# Patient Record
Sex: MALE | Race: WHITE | NOT HISPANIC OR LATINO | Employment: FULL TIME | URBAN - METROPOLITAN AREA
[De-identification: names, ages, dates, MRNs, and addresses within clinical notes are randomized per-mention and may not be internally consistent; named-entity substitution may affect disease eponyms.]

---

## 2023-08-15 ENCOUNTER — HOSPITAL ENCOUNTER (OUTPATIENT)
Dept: DATA CONVERSION | Facility: HOSPITAL | Age: 63
Discharge: HOME | End: 2023-08-15

## 2023-08-15 DIAGNOSIS — R29.818 OTHER SYMPTOMS AND SIGNS INVOLVING THE NERVOUS SYSTEM: ICD-10-CM

## 2023-08-15 DIAGNOSIS — Z79.899 OTHER LONG TERM (CURRENT) DRUG THERAPY: ICD-10-CM

## 2023-08-15 DIAGNOSIS — Z87.891 PERSONAL HISTORY OF NICOTINE DEPENDENCE: ICD-10-CM

## 2023-08-15 DIAGNOSIS — Z79.82 LONG TERM (CURRENT) USE OF ASPIRIN: ICD-10-CM

## 2023-08-15 DIAGNOSIS — R51.9 HEADACHE, UNSPECIFIED: ICD-10-CM

## 2023-08-15 DIAGNOSIS — R56.9 UNSPECIFIED CONVULSIONS (MULTI): ICD-10-CM

## 2023-08-15 LAB
ANION GAP SERPL CALCULATED.3IONS-SCNC: 8 MMOL/L (ref 0–19)
BASOPHILS # BLD AUTO: 0.05 K/UL (ref 0–0.22)
BASOPHILS NFR BLD AUTO: 0.9 % (ref 0–1)
BUN SERPL-MCNC: 18 MG/DL (ref 8–25)
BUN/CREAT SERPL: 15 RATIO (ref 8–21)
CALCIUM SERPL-MCNC: 9.6 MG/DL (ref 8.5–10.4)
CHLORIDE SERPL-SCNC: 104 MMOL/L (ref 97–107)
CK SERPL-CCNC: 120 U/L (ref 24–195)
CO2 SERPL-SCNC: 26 MMOL/L (ref 24–31)
CREAT SERPL-MCNC: 1.2 MG/DL (ref 0.4–1.6)
DEPRECATED RDW RBC AUTO: 40.6 FL (ref 37–54)
DIFFERENTIAL METHOD BLD: ABNORMAL
EOSINOPHIL # BLD AUTO: 0.22 K/UL (ref 0–0.45)
EOSINOPHIL NFR BLD: 4 % (ref 0–3)
ERYTHROCYTE [DISTWIDTH] IN BLOOD BY AUTOMATED COUNT: 12.3 % (ref 11.7–15)
ETHANOL SERPL-MCNC: <0.01 GM/DL (ref 0–0.01)
GFR SERPL CREATININE-BSD FRML MDRD: 68 ML/MIN/1.73 M2
GLUCOSE BLD STRIP.AUTO-MCNC: 95 MG/DL (ref 65–99)
GLUCOSE SERPL-MCNC: 115 MG/DL (ref 65–99)
HCT VFR BLD AUTO: 43.7 % (ref 41–50)
HGB BLD-MCNC: 15.8 GM/DL (ref 13.5–16.5)
IMM GRANULOCYTES # BLD AUTO: 0.01 K/UL (ref 0–0.1)
LACTATE BLDV-SCNC: 1.2 MMOL/L (ref 0.4–2)
LYMPHOCYTES # BLD AUTO: 2.24 K/UL (ref 1.2–3.2)
LYMPHOCYTES NFR BLD MANUAL: 40.7 % (ref 20–40)
MAGNESIUM SERPL-MCNC: 1.9 MG/DL (ref 1.6–3.1)
MCH RBC QN AUTO: 32.8 PG (ref 26–34)
MCHC RBC AUTO-ENTMCNC: 36.2 % (ref 31–37)
MCV RBC AUTO: 90.9 FL (ref 80–100)
MONOCYTES # BLD AUTO: 0.41 K/UL (ref 0–0.8)
MONOCYTES NFR BLD MANUAL: 7.5 % (ref 0–8)
NEUTROPHILS # BLD AUTO: 2.57 K/UL
NEUTROPHILS # BLD AUTO: 2.57 K/UL (ref 1.8–7.7)
NEUTROPHILS.IMMATURE NFR BLD: 0.2 % (ref 0–1)
NEUTS SEG NFR BLD: 46.7 % (ref 50–70)
NRBC BLD-RTO: 0 /100 WBC
PLATELET # BLD AUTO: 179 K/UL (ref 150–450)
PMV BLD AUTO: 10.3 CU (ref 7–12.6)
POTASSIUM SERPL-SCNC: 4.1 MMOL/L (ref 3.4–5.1)
RBC # BLD AUTO: 4.81 M/UL (ref 4.5–5.5)
SODIUM SERPL-SCNC: 138 MMOL/L (ref 133–145)
WBC # BLD AUTO: 5.5 K/UL (ref 4.5–11)

## 2023-09-16 VITALS
OXYGEN SATURATION: 98 % | SYSTOLIC BLOOD PRESSURE: 154 MMHG | HEART RATE: 74 BPM | BODY MASS INDEX: 42.06 KG/M2 | HEIGHT: 67 IN | DIASTOLIC BLOOD PRESSURE: 94 MMHG | RESPIRATION RATE: 18 BRPM | WEIGHT: 268 LBS | TEMPERATURE: 97.3 F

## 2023-10-02 PROBLEM — R29.818 TRANSIENT NEUROLOGICAL SYMPTOMS: Status: ACTIVE | Noted: 2023-10-02

## 2023-10-02 PROBLEM — K21.9 GASTROESOPHAGEAL REFLUX DISEASE WITHOUT ESOPHAGITIS: Status: ACTIVE | Noted: 2023-10-02

## 2023-10-02 PROBLEM — F41.9 ANXIETY: Status: ACTIVE | Noted: 2023-10-02

## 2023-10-02 PROBLEM — I96: Status: ACTIVE | Noted: 2023-10-02

## 2023-10-02 PROBLEM — G45.9 TRANSIENT CEREBRAL ISCHEMIA: Status: ACTIVE | Noted: 2023-10-02

## 2023-10-02 PROBLEM — G47.19: Status: ACTIVE | Noted: 2023-10-02

## 2023-10-02 PROBLEM — L08.89 OTHER SPECIFIED LOCAL INFECTIONS OF THE SKIN AND SUBCUTANEOUS TISSUE: Status: ACTIVE | Noted: 2023-10-02

## 2023-10-02 PROBLEM — G47.33 OBSTRUCTIVE SLEEP APNEA SYNDROME: Status: ACTIVE | Noted: 2023-10-02

## 2023-10-02 PROBLEM — J45.909 ASTHMA (HHS-HCC): Status: ACTIVE | Noted: 2023-10-02

## 2023-10-02 PROBLEM — I63.9 CEREBRAL INFARCTION (MULTI): Status: ACTIVE | Noted: 2023-10-02

## 2023-10-02 PROBLEM — Z99.89 CPAP (CONTINUOUS POSITIVE AIRWAY PRESSURE) DEPENDENCE: Status: ACTIVE | Noted: 2023-10-02

## 2023-10-02 PROBLEM — F33.2 SEVERE RECURRENT MAJOR DEPRESSION WITHOUT PSYCHOTIC FEATURES (MULTI): Status: ACTIVE | Noted: 2023-10-02

## 2023-10-02 PROBLEM — E66.01 OBESITY, MORBID, BMI 40.0-49.9 (MULTI): Status: ACTIVE | Noted: 2023-10-02

## 2023-10-02 PROBLEM — G47.419 SLEEP ATTACK (HHS-HCC): Status: ACTIVE | Noted: 2023-10-02

## 2023-10-02 PROBLEM — I63.9 CEREBROVASCULAR ACCIDENT (CVA) (MULTI): Status: ACTIVE | Noted: 2023-10-02

## 2023-10-02 PROBLEM — E66.01 OBESITY, MORBID, BMI 40.0-49.9 (MULTI): Status: RESOLVED | Noted: 2023-10-02 | Resolved: 2023-10-02

## 2023-10-02 PROBLEM — R47.01 APHASIA: Status: ACTIVE | Noted: 2023-10-02

## 2023-10-02 PROBLEM — E83.119 HEMOCHROMATOSIS: Status: ACTIVE | Noted: 2023-10-02

## 2023-10-02 PROBLEM — F41.0 PANIC ATTACK: Status: ACTIVE | Noted: 2023-10-02

## 2023-10-02 PROBLEM — R25.1 TREMOR, PHYSIOLOGICAL: Status: ACTIVE | Noted: 2023-10-02

## 2023-10-02 PROBLEM — E78.5 HYPERLIPIDEMIA: Status: ACTIVE | Noted: 2023-10-02

## 2023-10-02 PROBLEM — E66.9 OBESITY (BMI 30-39.9): Status: ACTIVE | Noted: 2023-10-02

## 2023-10-02 PROBLEM — G47.52 REM SLEEP BEHAVIOR DISORDER: Status: ACTIVE | Noted: 2023-10-02

## 2023-10-02 PROBLEM — F44.5 PSYCHOGENIC NONEPILEPTIC SEIZURE: Status: ACTIVE | Noted: 2023-10-02

## 2023-10-02 PROBLEM — G43.901 STATUS MIGRAINOSUS: Status: ACTIVE | Noted: 2023-10-02

## 2023-10-02 PROBLEM — I10 HYPERTENSION: Status: ACTIVE | Noted: 2023-10-02

## 2023-10-02 PROBLEM — G47.10 HYPERSOMNIA: Status: ACTIVE | Noted: 2023-10-02

## 2023-10-02 PROBLEM — Z95.9 CARDIAC DEVICE IN SITU: Status: ACTIVE | Noted: 2023-10-02

## 2023-10-02 PROBLEM — M19.90 OSTEOARTHRITIS: Status: ACTIVE | Noted: 2023-10-02

## 2023-10-02 RX ORDER — MODAFINIL 100 MG/1
0.5 TABLET ORAL EVERY MORNING
COMMUNITY
End: 2023-11-02 | Stop reason: SDUPTHER

## 2023-10-02 RX ORDER — ASPIRIN 81 MG/1
81 TABLET ORAL DAILY
COMMUNITY
End: 2023-12-21 | Stop reason: ENTERED-IN-ERROR

## 2023-10-02 RX ORDER — CLOPIDOGREL BISULFATE 75 MG/1
1 TABLET ORAL DAILY
COMMUNITY
End: 2023-11-02 | Stop reason: WASHOUT

## 2023-10-02 RX ORDER — ATORVASTATIN CALCIUM 40 MG/1
40 TABLET, FILM COATED ORAL NIGHTLY
COMMUNITY
End: 2023-11-07

## 2023-10-02 RX ORDER — METOPROLOL SUCCINATE 25 MG/1
25 TABLET, EXTENDED RELEASE ORAL DAILY
COMMUNITY
End: 2023-11-07

## 2023-10-02 RX ORDER — SOLRIAMFETOL 150 MG/1
150 TABLET, FILM COATED ORAL DAILY
COMMUNITY
End: 2023-11-02 | Stop reason: SDUPTHER

## 2023-10-02 RX ORDER — GABAPENTIN 100 MG/1
CAPSULE ORAL
COMMUNITY
Start: 2023-08-15 | End: 2023-10-03

## 2023-10-02 RX ORDER — SERTRALINE HYDROCHLORIDE 100 MG/1
1 TABLET, FILM COATED ORAL DAILY
COMMUNITY
End: 2023-11-08 | Stop reason: ALTCHOICE

## 2023-10-02 RX ORDER — FLUTICASONE PROPIONATE 50 MCG
1 SPRAY, SUSPENSION (ML) NASAL DAILY
COMMUNITY
Start: 2022-07-28 | End: 2023-11-02 | Stop reason: WASHOUT

## 2023-10-02 RX ORDER — DULOXETIN HYDROCHLORIDE 60 MG/1
1 CAPSULE, DELAYED RELEASE ORAL DAILY
COMMUNITY
End: 2023-10-04 | Stop reason: SDUPTHER

## 2023-10-02 RX ORDER — ALBUTEROL SULFATE 90 UG/1
AEROSOL, METERED RESPIRATORY (INHALATION)
COMMUNITY

## 2023-10-02 RX ORDER — CHOLECALCIFEROL (VITAMIN D3) 25 MCG
1 TABLET ORAL DAILY
COMMUNITY
End: 2023-11-02 | Stop reason: WASHOUT

## 2023-10-02 RX ORDER — DULOXETIN HYDROCHLORIDE 30 MG/1
1 CAPSULE, DELAYED RELEASE ORAL DAILY
COMMUNITY
End: 2023-10-04 | Stop reason: SDUPTHER

## 2023-10-02 RX ORDER — ISOSORBIDE MONONITRATE 60 MG/1
60 TABLET, EXTENDED RELEASE ORAL DAILY
COMMUNITY
End: 2023-11-02 | Stop reason: WASHOUT

## 2023-10-02 RX ORDER — FENOFIBRATE 54 MG/1
54 TABLET ORAL DAILY
COMMUNITY
End: 2023-11-07

## 2023-10-03 DIAGNOSIS — R56.9 UNSPECIFIED CONVULSIONS (MULTI): ICD-10-CM

## 2023-10-03 RX ORDER — GABAPENTIN 100 MG/1
CAPSULE ORAL
Qty: 120 CAPSULE | Refills: 2 | Status: SHIPPED | OUTPATIENT
Start: 2023-10-03 | End: 2023-12-21 | Stop reason: ENTERED-IN-ERROR

## 2023-10-04 ENCOUNTER — TELEMEDICINE (OUTPATIENT)
Dept: BEHAVIORAL HEALTH | Facility: CLINIC | Age: 63
End: 2023-10-04
Payer: COMMERCIAL

## 2023-10-04 DIAGNOSIS — F33.1 MODERATE EPISODE OF RECURRENT MAJOR DEPRESSIVE DISORDER (MULTI): ICD-10-CM

## 2023-10-04 DIAGNOSIS — F41.9 ANXIETY: ICD-10-CM

## 2023-10-04 PROCEDURE — 99213 OFFICE O/P EST LOW 20 MIN: CPT | Performed by: PSYCHIATRY & NEUROLOGY

## 2023-10-04 RX ORDER — DULOXETIN HYDROCHLORIDE 60 MG/1
60 CAPSULE, DELAYED RELEASE ORAL DAILY
Qty: 90 CAPSULE | Refills: 0 | Status: SHIPPED | OUTPATIENT
Start: 2023-10-04 | End: 2023-11-08 | Stop reason: SDUPTHER

## 2023-10-04 RX ORDER — DULOXETIN HYDROCHLORIDE 30 MG/1
30 CAPSULE, DELAYED RELEASE ORAL DAILY
Qty: 90 CAPSULE | Refills: 0 | Status: SHIPPED | OUTPATIENT
Start: 2023-10-04 | End: 2023-11-08 | Stop reason: SDUPTHER

## 2023-10-04 ASSESSMENT — ENCOUNTER SYMPTOMS
AGITATION: 0
DIARRHEA: 1
HEADACHES: 1
NAUSEA: 1

## 2023-10-04 NOTE — PROGRESS NOTES
Subjective   Patient ID: Jace Villa is a 62 y.o. male follow up for anxiety     Presents virtual visit for follow up.   We increased Cymbalta last preston, feels improved, not as anxious or tense.   Still have non epileptic seizure, neurology.   Has not seen a therapist yet.   Wearing his Cpap           Review of Systems   Gastrointestinal:  Positive for diarrhea and nausea.   Neurological:  Positive for headaches.        Non epileptic seizure ???   Psychiatric/Behavioral:  Negative for agitation.        Objective   Physical Exam  Psychiatric:         Attention and Perception: He is attentive. He does not perceive auditory or visual hallucinations.         Mood and Affect: Mood is anxious. Mood is not depressed or elated. Affect is not labile, blunt, flat, angry, tearful or inappropriate.         Speech: He is communicative. Speech is not rapid and pressured, delayed, slurred or tangential.         Behavior: Behavior normal. Behavior is cooperative.         Thought Content: Thought content normal.         Cognition and Memory: Cognition and memory normal.         Judgment: Judgment normal.         Assessment/Plan   Diagnoses and all orders for this visit:  Moderate episode of recurrent major depressive disorder (CMS/HCC)  Anxiety      Will renew Cymbalta   Therapy referral   Follow up 3 months or sooner if needed

## 2023-11-02 ENCOUNTER — OFFICE VISIT (OUTPATIENT)
Dept: SLEEP MEDICINE | Facility: CLINIC | Age: 63
End: 2023-11-02
Payer: COMMERCIAL

## 2023-11-02 VITALS
WEIGHT: 258 LBS | BODY MASS INDEX: 38.21 KG/M2 | OXYGEN SATURATION: 93 % | HEART RATE: 73 BPM | DIASTOLIC BLOOD PRESSURE: 76 MMHG | SYSTOLIC BLOOD PRESSURE: 144 MMHG | HEIGHT: 69 IN

## 2023-11-02 DIAGNOSIS — G47.33 OBSTRUCTIVE SLEEP APNEA SYNDROME: Primary | ICD-10-CM

## 2023-11-02 PROCEDURE — 1036F TOBACCO NON-USER: CPT | Performed by: INTERNAL MEDICINE

## 2023-11-02 PROCEDURE — 3077F SYST BP >= 140 MM HG: CPT | Performed by: INTERNAL MEDICINE

## 2023-11-02 PROCEDURE — 99214 OFFICE O/P EST MOD 30 MIN: CPT | Performed by: INTERNAL MEDICINE

## 2023-11-02 PROCEDURE — 3078F DIAST BP <80 MM HG: CPT | Performed by: INTERNAL MEDICINE

## 2023-11-02 RX ORDER — SOLRIAMFETOL 150 MG/1
150 TABLET, FILM COATED ORAL DAILY
Qty: 90 TABLET | Refills: 0 | Status: SHIPPED | OUTPATIENT
Start: 2023-11-02 | End: 2023-12-19

## 2023-11-02 RX ORDER — MODAFINIL 100 MG/1
50 TABLET ORAL EVERY MORNING
Qty: 45 TABLET | Refills: 0 | Status: SHIPPED | OUTPATIENT
Start: 2023-11-02 | End: 2024-01-31

## 2023-11-02 ASSESSMENT — PAIN SCALES - GENERAL: PAINLEVEL: 0-NO PAIN

## 2023-11-02 ASSESSMENT — SLEEP AND FATIGUE QUESTIONNAIRES
HOW LIKELY ARE YOU TO NOD OFF OR FALL ASLEEP WHILE SITTING QUIETLY AFTER LUNCH WITHOUT ALCOHOL: SLIGHT CHANCE OF DOZING
HOW LIKELY ARE YOU TO NOD OFF OR FALL ASLEEP WHILE LYING DOWN TO REST IN THE AFTERNOON WHEN CIRCUMSTANCES PERMIT: HIGH CHANCE OF DOZING
HOW LIKELY ARE YOU TO NOD OFF OR FALL ASLEEP WHEN YOU ARE A PASSENGER IN A CAR FOR AN HOUR WITHOUT A BREAK: SLIGHT CHANCE OF DOZING
HOW LIKELY ARE YOU TO NOD OFF OR FALL ASLEEP WHILE WATCHING TV: MODERATE CHANCE OF DOZING
HOW LIKELY ARE YOU TO NOD OFF OR FALL ASLEEP WHILE SITTING AND READING: SLIGHT CHANCE OF DOZING
HOW LIKELY ARE YOU TO NOD OFF OR FALL ASLEEP IN A CAR, WHILE STOPPED FOR A FEW MINUTES IN TRAFFIC: WOULD NEVER DOZE
HOW LIKELY ARE YOU TO NOD OFF OR FALL ASLEEP WHILE SITTING AND TALKING TO SOMEONE: WOULD NEVER DOZE
ESS-CHAD TOTAL SCORE: 10
SITING INACTIVE IN A PUBLIC PLACE LIKE A CLASS ROOM OR A MOVIE THEATER: MODERATE CHANCE OF DOZING

## 2023-11-02 NOTE — ASSESSMENT & PLAN NOTE
Good control with CPAP and the combination of sunosi 150 mg and modafinil 50 mg. Did not tolerate higher doses of modafinil alone and was still symptomatic with sunosi 150 mg alone. This combination is effective and allows him to function. He is following with a headache specialist to determine the cause of his headaches

## 2023-11-02 NOTE — PROGRESS NOTES
"    Subjective   Patient ID: Jace Villa is a 63 y.o. male who presents for Pap Adherence Followup and Sleep Apnea.  HPI    Prior Sleep History:      Current Sleep History:  Here to follow-up on management of his obstructive sleep apnea with hypersomnolence    A downloaded compliance report was reviewed and was interpreted by myself as follows:  > 4 hour compliance was 93% with an average use of 6 hours and 16 minutes, with a residual AHI 2.7 CPAP 12 cm H2O.   Currently managed with Sunosi 150 mg daily and modafinil 50 mg daily with good response.  He continues to follow-up with neurology and psychiatry for his last seizure.  He is scheduled for video EEG through neurology he is advised to not drive. He is going to [facility] for neurology because he reports that he has had difficulty getting in to see a neurologist at . He is looking to go on disability is concerned about temporary disability running out.    Jace Villa reports good benefit from his device.    ESS: 10     Review of Systems  Review of systems negative except as per HPI  Objective   /76   Pulse 73   Ht 1.753 m (5' 9\")   Wt 117 kg (258 lb)   SpO2 93%   BMI 38.10 kg/m²    Physical Exam  PHYSICAL EXAM: GENERAL: alert pleasant and cooperative no acute distress  PSYCH EXAM: alert,oriented, in NAD with a full range of affect, normal behavior and no psychotic features    Lab Results   Component Value Date    IRON 270 (H) 07/26/2023    TIBC 347 07/26/2023    FERRITIN 287 07/26/2023        Assessment/Plan   Problem List Items Addressed This Visit             ICD-10-CM    Obstructive sleep apnea syndrome - Primary G47.33     Good control with CPAP and the combination of sunosi 150 mg and modafinil 50 mg. Did not tolerate higher doses of modafinil alone and was still symptomatic with sunosi 150 mg alone. This combination is effective and allows him to function         Relevant Medications    modafinil (Provigil) 100 mg tablet    solriamfetoL " (Sunosi) 150 mg tablet

## 2023-11-03 RX ORDER — BUTALBITAL, ACETAMINOPHEN AND CAFFEINE 50; 325; 40 MG/1; MG/1; MG/1
TABLET ORAL
COMMUNITY
Start: 2023-07-26 | End: 2023-12-21 | Stop reason: ALTCHOICE

## 2023-11-03 RX ORDER — IBUPROFEN 100 MG/5ML
1000 SUSPENSION, ORAL (FINAL DOSE FORM) ORAL
COMMUNITY
End: 2023-12-21 | Stop reason: ENTERED-IN-ERROR

## 2023-11-03 RX ORDER — DOXYCYCLINE HYCLATE 100 MG
TABLET ORAL
COMMUNITY
Start: 2023-01-28 | End: 2023-11-08 | Stop reason: ALTCHOICE

## 2023-11-03 RX ORDER — COVID-19 ANTIGEN TEST
KIT MISCELLANEOUS
COMMUNITY
Start: 2023-04-14 | End: 2023-12-21 | Stop reason: ENTERED-IN-ERROR

## 2023-11-03 RX ORDER — NABUMETONE 500 MG/1
TABLET, FILM COATED ORAL
COMMUNITY
Start: 2015-08-01 | End: 2023-12-21 | Stop reason: ENTERED-IN-ERROR

## 2023-11-03 RX ORDER — SIMVASTATIN 40 MG/1
40 TABLET, FILM COATED ORAL
COMMUNITY
End: 2023-12-21 | Stop reason: ENTERED-IN-ERROR

## 2023-11-03 RX ORDER — AZITHROMYCIN 250 MG/1
TABLET, FILM COATED ORAL
COMMUNITY
Start: 2023-01-30 | End: 2023-11-08 | Stop reason: ALTCHOICE

## 2023-11-03 RX ORDER — PREDNISONE 20 MG/1
TABLET ORAL
COMMUNITY
Start: 2023-03-29 | End: 2023-11-08 | Stop reason: ALTCHOICE

## 2023-11-03 RX ORDER — BENZONATATE 200 MG/1
200 CAPSULE ORAL 3 TIMES DAILY
COMMUNITY
Start: 2023-03-29 | End: 2023-04-08

## 2023-11-03 RX ORDER — DOXYCYCLINE 100 MG/1
100 CAPSULE ORAL 2 TIMES DAILY
COMMUNITY
Start: 2023-03-29 | End: 2023-04-08

## 2023-11-03 RX ORDER — METOPROLOL SUCCINATE 50 MG/1
50 TABLET, EXTENDED RELEASE ORAL DAILY
COMMUNITY
Start: 2023-05-05 | End: 2023-08-03 | Stop reason: WASHOUT

## 2023-11-03 RX ORDER — AMOXICILLIN AND CLAVULANATE POTASSIUM 875; 125 MG/1; MG/1
1 TABLET, FILM COATED ORAL EVERY 12 HOURS
COMMUNITY
Start: 2022-12-03 | End: 2022-12-13

## 2023-11-05 DIAGNOSIS — E78.49 OTHER HYPERLIPIDEMIA: ICD-10-CM

## 2023-11-07 RX ORDER — METOPROLOL SUCCINATE 25 MG/1
25 TABLET, EXTENDED RELEASE ORAL DAILY
Qty: 90 TABLET | Refills: 3 | Status: SHIPPED | OUTPATIENT
Start: 2023-11-07 | End: 2024-11-01

## 2023-11-07 RX ORDER — ATORVASTATIN CALCIUM 40 MG/1
40 TABLET, FILM COATED ORAL DAILY
Qty: 90 TABLET | Refills: 3 | Status: SHIPPED | OUTPATIENT
Start: 2023-11-07 | End: 2024-11-01

## 2023-11-07 RX ORDER — FENOFIBRATE 54 MG/1
TABLET ORAL
Qty: 90 TABLET | Refills: 3 | Status: SHIPPED | OUTPATIENT
Start: 2023-11-07

## 2023-11-08 ENCOUNTER — OFFICE VISIT (OUTPATIENT)
Dept: BEHAVIORAL HEALTH | Facility: CLINIC | Age: 63
End: 2023-11-08
Payer: COMMERCIAL

## 2023-11-08 VITALS
HEART RATE: 89 BPM | TEMPERATURE: 97.8 F | HEIGHT: 69 IN | BODY MASS INDEX: 39.4 KG/M2 | SYSTOLIC BLOOD PRESSURE: 135 MMHG | DIASTOLIC BLOOD PRESSURE: 85 MMHG | WEIGHT: 266 LBS

## 2023-11-08 DIAGNOSIS — F33.1 MODERATE EPISODE OF RECURRENT MAJOR DEPRESSIVE DISORDER (MULTI): ICD-10-CM

## 2023-11-08 DIAGNOSIS — F41.9 ANXIETY: ICD-10-CM

## 2023-11-08 PROCEDURE — 1036F TOBACCO NON-USER: CPT | Performed by: PSYCHIATRY & NEUROLOGY

## 2023-11-08 PROCEDURE — 3079F DIAST BP 80-89 MM HG: CPT | Performed by: PSYCHIATRY & NEUROLOGY

## 2023-11-08 PROCEDURE — 3075F SYST BP GE 130 - 139MM HG: CPT | Performed by: PSYCHIATRY & NEUROLOGY

## 2023-11-08 PROCEDURE — 99214 OFFICE O/P EST MOD 30 MIN: CPT | Performed by: PSYCHIATRY & NEUROLOGY

## 2023-11-08 RX ORDER — DULOXETIN HYDROCHLORIDE 30 MG/1
30 CAPSULE, DELAYED RELEASE ORAL DAILY
Qty: 90 CAPSULE | Refills: 0 | Status: SHIPPED | OUTPATIENT
Start: 2023-11-08 | End: 2024-01-17 | Stop reason: DRUGHIGH

## 2023-11-08 RX ORDER — DULOXETIN HYDROCHLORIDE 60 MG/1
60 CAPSULE, DELAYED RELEASE ORAL DAILY
Qty: 90 CAPSULE | Refills: 0 | Status: SHIPPED | OUTPATIENT
Start: 2023-11-08 | End: 2024-01-17 | Stop reason: SDUPTHER

## 2023-11-08 ASSESSMENT — ENCOUNTER SYMPTOMS
DECREASED CONCENTRATION: 0
HALLUCINATIONS: 0
NUMBNESS: 1
LIGHT-HEADEDNESS: 1
CONSTIPATION: 0
CONFUSION: 0
FACIAL ASYMMETRY: 0
HYPERACTIVE: 0
TREMORS: 1
HEADACHES: 1
SEIZURES: 1
AGITATION: 0
DYSPHORIC MOOD: 0
NAUSEA: 0
NERVOUS/ANXIOUS: 0
DIARRHEA: 0
VOMITING: 0
SLEEP DISTURBANCE: 0
PALPITATIONS: 1

## 2023-11-08 NOTE — PROGRESS NOTES
Subjective   Patient ID: Jace Villa is a 63 y.o. male who presents for follow up  Doing projects around the house, enjoys it. Keeps him busy. Sleep is alright overall, follows up with sleep medicine, on C pap and Provigil   Follows up with neurology, scheduled for video EEG this weekend   Still has non epileptic episodes   Has not been in therapy yet. Talked about his relationship with his  father and explored its relationship with his non epileptic seizure ( time-line )        Review of Systems   Cardiovascular:  Positive for chest pain and palpitations.   Gastrointestinal:  Negative for constipation, diarrhea, nausea and vomiting.   Neurological:  Positive for tremors, seizures, light-headedness, numbness and headaches. Negative for facial asymmetry.        Non epileptic seizure   Psychiatric/Behavioral:  Negative for agitation, behavioral problems, confusion, decreased concentration, dysphoric mood, hallucinations, self-injury, sleep disturbance and suicidal ideas. The patient is not nervous/anxious and is not hyperactive.        Objective   Physical Exam  Psychiatric:         Attention and Perception: Attention and perception normal.         Mood and Affect: Mood is anxious.         Speech: Speech normal.         Behavior: Behavior normal. Behavior is cooperative.         Thought Content: Thought content normal.         Cognition and Memory: Cognition normal.         Judgment: Judgment normal.       There were no vitals filed for this visit.   Legacy Encounter on 2023   Component Date Value    Calcium 2023 9.9     AST 2023 37     Alkaline Phosphatase 2023 69     Total Bilirubin 2023 0.7     Total Protein 2023 6.9     Albumin 2023 4.4     Globulin, Total 2023 2.5     A/G Ratio 2023 1.8     Sodium 2023 139     Potassium 2023 4.5     Chloride 2023 102     Bicarbonate 2023 25     Anion Gap 2023 12     Urea Nitrogen  07/26/2023 17     Creatinine 07/26/2023 1.2     Urea Nitrogen/Creatinine* 07/26/2023 14.2     Glucose 07/26/2023 116 (H)     ALT (SGPT) 07/26/2023 47 (H)     ESTIMATED GFR 07/26/2023 68     Differential Type 07/26/2023 AUTO DIFF     Immature Granulocyte %, * 07/26/2023 0.20     Neutrophil 07/26/2023 52.00     Lymphocytes % 07/26/2023 36.10     Monocytes % 07/26/2023 7.70     Eosinophil 07/26/2023 3.30 (H)     Basophils % 07/26/2023 0.70     Immature Granulocytes Ab* 07/26/2023 0.01     Neutrophils Absolute 07/26/2023 3.03     Lymphocytes Absolute 07/26/2023 2.10     Monocytes Absolute 07/26/2023 0.45     Eosinophils Absolute 07/26/2023 0.19     Basophils Absolute 07/26/2023 0.04     WBC 07/26/2023 5.8     RBC 07/26/2023 5.16     Hemoglobin 07/26/2023 16.5     Hematocrit 07/26/2023 47.2     MCV 07/26/2023 91.5     MCH 07/26/2023 32.0     MCHC 07/26/2023 35.0     RDW-SD 07/26/2023 42.1     RDW-CV 07/26/2023 12.6     Platelets 07/26/2023 209     MPV 07/26/2023 10.9     nRBC 07/26/2023 0     ABSOLUTE NEUTROPHIL CALC* 07/26/2023 3.03     Ferritin 07/26/2023 287     Iron 07/26/2023 270 (H)     TIBC 07/26/2023 347     Iron Saturation 07/26/2023 77.8 (H)     SERUM COLOR 07/26/2023 YELLOW     SERUM CLARITY 07/26/2023 CLEAR     Is the patient fasting? 07/26/2023 FASTING     Cholesterol 07/26/2023 143     Triglycerides 07/26/2023 169 (H)     HDL 07/26/2023 42     LDL Calculated 07/26/2023 67     Cholesterol/HDL Ratio 07/26/2023 3.4     PSA 07/26/2023 2.3     Thyroid Stimulating Horm* 07/26/2023 1.70      Lab Results   Component Value Date     08/15/2023     07/26/2023     08/01/2022    K 4.1 08/15/2023    K 4.5 07/26/2023    K 4.3 08/01/2022    CO2 26 08/15/2023    CO2 25 07/26/2023    CO2 32 (H) 08/01/2022    BUN 18 08/15/2023    BUN 17 07/26/2023    BUN 19 08/01/2022    CREATININE 1.2 08/15/2023    CREATININE 1.2 07/26/2023    CREATININE 1.1 08/01/2022    GLUCOSE 115 (H) 08/15/2023    GLUCOSE 116 (H)  07/26/2023    GLUCOSE 114 (H) 08/01/2022    CALCIUM 9.6 08/15/2023    CALCIUM 9.9 07/26/2023    CALCIUM 10.1 08/01/2022     Lab Results   Component Value Date    WBC 5.5 08/15/2023    HGB 15.8 08/15/2023    HCT 43.7 08/15/2023    MCV 90.9 08/15/2023     08/15/2023     Lab Results   Component Value Date    CHOL 143 07/26/2023    TRIG 169 (H) 07/26/2023    HDL 42 07/26/2023     Assessment/Plan   Problem List Items Addressed This Visit             ICD-10-CM    Anxiety F41.9    Moderate episode of recurrent major depressive disorder (CMS/HCC) F33.1     Continue Cymbalta as prescribed   Explored triggers, worked insight   Follow up with neurology recommendations   A list of therapists was given to him, encouraged to schedule for therapy for more in depth stress management   Follow up in 3 months or sooner if needed

## 2023-12-13 ENCOUNTER — HOSPITAL ENCOUNTER (OUTPATIENT)
Dept: CARDIOLOGY | Facility: HOSPITAL | Age: 63
Discharge: HOME | End: 2023-12-13
Payer: COMMERCIAL

## 2023-12-13 DIAGNOSIS — I25.10 CORONARY ARTERY DISEASE, UNSPECIFIED VESSEL OR LESION TYPE, UNSPECIFIED WHETHER ANGINA PRESENT, UNSPECIFIED WHETHER NATIVE OR TRANSPLANTED HEART: ICD-10-CM

## 2023-12-13 LAB
AORTIC VALVE PEAK VELOCITY: 0.96
AV PEAK GRADIENT: 3.7
EJECTION FRACTION APICAL 4 CHAMBER: 54.8
EJECTION FRACTION: 48
LEFT ATRIUM VOLUME AREA LENGTH INDEX BSA: 24.6
LEFT VENTRICLE INTERNAL DIMENSION DIASTOLE: 5.01 (ref 3.5–6)
MITRAL VALVE E/A RATIO: 1.07
MITRAL VALVE E/E' RATIO: 11.6
RIGHT VENTRICLE FREE WALL PEAK S': 9.79

## 2023-12-13 PROCEDURE — 93306 TTE W/DOPPLER COMPLETE: CPT

## 2023-12-15 ENCOUNTER — LAB (OUTPATIENT)
Dept: LAB | Facility: LAB | Age: 63
End: 2023-12-15
Payer: COMMERCIAL

## 2023-12-15 DIAGNOSIS — I25.10 ATHEROSCLEROTIC HEART DISEASE OF NATIVE CORONARY ARTERY WITHOUT ANGINA PECTORIS: Primary | ICD-10-CM

## 2023-12-15 PROCEDURE — 83880 ASSAY OF NATRIURETIC PEPTIDE: CPT

## 2023-12-15 PROCEDURE — 36415 COLL VENOUS BLD VENIPUNCTURE: CPT

## 2023-12-16 LAB — BNP SERPL-MCNC: 12 PG/ML (ref 0–99)

## 2023-12-19 DIAGNOSIS — G47.33 OBSTRUCTIVE SLEEP APNEA SYNDROME: ICD-10-CM

## 2023-12-19 RX ORDER — SOLRIAMFETOL 150 MG/1
TABLET, FILM COATED ORAL
Qty: 30 TABLET | Refills: 2 | Status: SHIPPED | OUTPATIENT
Start: 2023-12-19

## 2023-12-21 ENCOUNTER — APPOINTMENT (OUTPATIENT)
Dept: RADIOLOGY | Facility: HOSPITAL | Age: 63
End: 2023-12-21
Payer: COMMERCIAL

## 2023-12-21 ENCOUNTER — APPOINTMENT (OUTPATIENT)
Dept: CARDIOLOGY | Facility: HOSPITAL | Age: 63
End: 2023-12-21
Payer: COMMERCIAL

## 2023-12-21 ENCOUNTER — HOSPITAL ENCOUNTER (OUTPATIENT)
Facility: HOSPITAL | Age: 63
Setting detail: OBSERVATION
Discharge: HOME | End: 2023-12-22
Attending: STUDENT IN AN ORGANIZED HEALTH CARE EDUCATION/TRAINING PROGRAM | Admitting: INTERNAL MEDICINE
Payer: COMMERCIAL

## 2023-12-21 DIAGNOSIS — R07.9 CHEST PAIN, UNSPECIFIED TYPE: Primary | ICD-10-CM

## 2023-12-21 DIAGNOSIS — N17.9 ACUTE RENAL INJURY (CMS-HCC): ICD-10-CM

## 2023-12-21 LAB
ALBUMIN SERPL-MCNC: 3.9 G/DL (ref 3.5–5)
ALP BLD-CCNC: 58 U/L (ref 35–125)
ALT SERPL-CCNC: 35 U/L (ref 5–40)
ANION GAP SERPL CALC-SCNC: 9 MMOL/L
AST SERPL-CCNC: 24 U/L (ref 5–40)
BASOPHILS # BLD AUTO: 0.05 X10*3/UL (ref 0–0.1)
BASOPHILS NFR BLD AUTO: 0.6 %
BILIRUB SERPL-MCNC: 0.4 MG/DL (ref 0.1–1.2)
BUN SERPL-MCNC: 19 MG/DL (ref 8–25)
CALCIUM SERPL-MCNC: 9.4 MG/DL (ref 8.5–10.4)
CHLORIDE SERPL-SCNC: 105 MMOL/L (ref 97–107)
CO2 SERPL-SCNC: 24 MMOL/L (ref 24–31)
CREAT SERPL-MCNC: 2 MG/DL (ref 0.4–1.6)
EOSINOPHIL # BLD AUTO: 0.15 X10*3/UL (ref 0–0.7)
EOSINOPHIL NFR BLD AUTO: 1.9 %
ERYTHROCYTE [DISTWIDTH] IN BLOOD BY AUTOMATED COUNT: 12.7 % (ref 11.5–14.5)
GFR SERPL CREATININE-BSD FRML MDRD: 37 ML/MIN/1.73M*2
GLUCOSE SERPL-MCNC: 128 MG/DL (ref 65–99)
HCT VFR BLD AUTO: 46.3 % (ref 41–52)
HGB BLD-MCNC: 16.5 G/DL (ref 13.5–17.5)
IMM GRANULOCYTES # BLD AUTO: 0.03 X10*3/UL (ref 0–0.7)
IMM GRANULOCYTES NFR BLD AUTO: 0.4 % (ref 0–0.9)
LIPASE SERPL-CCNC: 25 U/L (ref 16–63)
LYMPHOCYTES # BLD AUTO: 2.28 X10*3/UL (ref 1.2–4.8)
LYMPHOCYTES NFR BLD AUTO: 28.8 %
MCH RBC QN AUTO: 32.5 PG (ref 26–34)
MCHC RBC AUTO-ENTMCNC: 35.6 G/DL (ref 32–36)
MCV RBC AUTO: 91 FL (ref 80–100)
MONOCYTES # BLD AUTO: 0.64 X10*3/UL (ref 0.1–1)
MONOCYTES NFR BLD AUTO: 8.1 %
NEUTROPHILS # BLD AUTO: 4.76 X10*3/UL (ref 1.2–7.7)
NEUTROPHILS NFR BLD AUTO: 60.2 %
NRBC BLD-RTO: 0 /100 WBCS (ref 0–0)
PLATELET # BLD AUTO: 168 X10*3/UL (ref 150–450)
POTASSIUM SERPL-SCNC: 4.8 MMOL/L (ref 3.4–5.1)
PROT SERPL-MCNC: 6.7 G/DL (ref 5.9–7.9)
RBC # BLD AUTO: 5.07 X10*6/UL (ref 4.5–5.9)
SODIUM SERPL-SCNC: 138 MMOL/L (ref 133–145)
TROPONIN T SERPL-MCNC: 21 NG/L
TROPONIN T SERPL-MCNC: 23 NG/L
WBC # BLD AUTO: 7.9 X10*3/UL (ref 4.4–11.3)

## 2023-12-21 PROCEDURE — 36415 COLL VENOUS BLD VENIPUNCTURE: CPT | Performed by: PHYSICIAN ASSISTANT

## 2023-12-21 PROCEDURE — 85025 COMPLETE CBC W/AUTO DIFF WBC: CPT | Performed by: PHYSICIAN ASSISTANT

## 2023-12-21 PROCEDURE — 83690 ASSAY OF LIPASE: CPT | Performed by: PHYSICIAN ASSISTANT

## 2023-12-21 PROCEDURE — G0378 HOSPITAL OBSERVATION PER HR: HCPCS

## 2023-12-21 PROCEDURE — 71046 X-RAY EXAM CHEST 2 VIEWS: CPT

## 2023-12-21 PROCEDURE — 84484 ASSAY OF TROPONIN QUANT: CPT | Performed by: PHYSICIAN ASSISTANT

## 2023-12-21 PROCEDURE — 93005 ELECTROCARDIOGRAM TRACING: CPT

## 2023-12-21 PROCEDURE — 80053 COMPREHEN METABOLIC PANEL: CPT | Performed by: PHYSICIAN ASSISTANT

## 2023-12-21 PROCEDURE — 93010 ELECTROCARDIOGRAM REPORT: CPT | Performed by: INTERNAL MEDICINE

## 2023-12-21 PROCEDURE — 96372 THER/PROPH/DIAG INJ SC/IM: CPT

## 2023-12-21 PROCEDURE — 74176 CT ABD & PELVIS W/O CONTRAST: CPT

## 2023-12-21 PROCEDURE — 99285 EMERGENCY DEPT VISIT HI MDM: CPT | Mod: 25 | Performed by: STUDENT IN AN ORGANIZED HEALTH CARE EDUCATION/TRAINING PROGRAM

## 2023-12-21 RX ORDER — ONDANSETRON HYDROCHLORIDE 2 MG/ML
4 INJECTION, SOLUTION INTRAVENOUS EVERY 8 HOURS PRN
Status: DISCONTINUED | OUTPATIENT
Start: 2023-12-21 | End: 2023-12-22 | Stop reason: HOSPADM

## 2023-12-21 RX ORDER — ENOXAPARIN SODIUM 100 MG/ML
40 INJECTION SUBCUTANEOUS EVERY 24 HOURS
Status: DISCONTINUED | OUTPATIENT
Start: 2023-12-21 | End: 2023-12-22 | Stop reason: HOSPADM

## 2023-12-21 RX ORDER — DULOXETIN HYDROCHLORIDE 30 MG/1
30 CAPSULE, DELAYED RELEASE ORAL
Status: DISCONTINUED | OUTPATIENT
Start: 2023-12-22 | End: 2023-12-22 | Stop reason: HOSPADM

## 2023-12-21 RX ORDER — ACETAMINOPHEN 325 MG/1
650 TABLET ORAL EVERY 4 HOURS PRN
Status: DISCONTINUED | OUTPATIENT
Start: 2023-12-21 | End: 2023-12-22 | Stop reason: HOSPADM

## 2023-12-21 RX ORDER — RANOLAZINE 500 MG/1
500 TABLET, EXTENDED RELEASE ORAL 2 TIMES DAILY
Status: DISCONTINUED | OUTPATIENT
Start: 2023-12-21 | End: 2023-12-22 | Stop reason: HOSPADM

## 2023-12-21 RX ORDER — ISOSORBIDE MONONITRATE 60 MG/1
60 TABLET, EXTENDED RELEASE ORAL DAILY
COMMUNITY

## 2023-12-21 RX ORDER — FENOFIBRATE 54 MG/1
54 TABLET ORAL DAILY
Status: DISCONTINUED | OUTPATIENT
Start: 2023-12-22 | End: 2023-12-22 | Stop reason: HOSPADM

## 2023-12-21 RX ORDER — ISOSORBIDE MONONITRATE 60 MG/1
60 TABLET, EXTENDED RELEASE ORAL DAILY
Status: DISCONTINUED | OUTPATIENT
Start: 2023-12-22 | End: 2023-12-22 | Stop reason: HOSPADM

## 2023-12-21 RX ORDER — RANOLAZINE 500 MG/1
500 TABLET, EXTENDED RELEASE ORAL 2 TIMES DAILY
COMMUNITY

## 2023-12-21 RX ORDER — NAPROXEN SODIUM 220 MG/1
81 TABLET, FILM COATED ORAL ONCE
Status: COMPLETED | OUTPATIENT
Start: 2023-12-21 | End: 2023-12-22

## 2023-12-21 RX ORDER — MODAFINIL 100 MG/1
50 TABLET ORAL EVERY MORNING
Status: DISCONTINUED | OUTPATIENT
Start: 2023-12-22 | End: 2023-12-22 | Stop reason: HOSPADM

## 2023-12-21 RX ORDER — ONDANSETRON 4 MG/1
4 TABLET, ORALLY DISINTEGRATING ORAL EVERY 8 HOURS PRN
Status: DISCONTINUED | OUTPATIENT
Start: 2023-12-21 | End: 2023-12-22 | Stop reason: HOSPADM

## 2023-12-21 RX ORDER — DULOXETIN HYDROCHLORIDE 60 MG/1
60 CAPSULE, DELAYED RELEASE ORAL DAILY
Status: DISCONTINUED | OUTPATIENT
Start: 2023-12-22 | End: 2023-12-22 | Stop reason: HOSPADM

## 2023-12-21 RX ORDER — ATORVASTATIN CALCIUM 40 MG/1
40 TABLET, FILM COATED ORAL DAILY
Status: DISCONTINUED | OUTPATIENT
Start: 2023-12-22 | End: 2023-12-22 | Stop reason: HOSPADM

## 2023-12-21 RX ORDER — METOPROLOL SUCCINATE 25 MG/1
25 TABLET, EXTENDED RELEASE ORAL DAILY
Status: DISCONTINUED | OUTPATIENT
Start: 2023-12-22 | End: 2023-12-22 | Stop reason: HOSPADM

## 2023-12-21 SDOH — SOCIAL STABILITY: SOCIAL INSECURITY: HAVE YOU HAD THOUGHTS OF HARMING ANYONE ELSE?: NO

## 2023-12-21 SDOH — SOCIAL STABILITY: SOCIAL INSECURITY: ABUSE: ADULT

## 2023-12-21 ASSESSMENT — ENCOUNTER SYMPTOMS
SHORTNESS OF BREATH: 1
CONSTITUTIONAL NEGATIVE: 1
EYES NEGATIVE: 1
PSYCHIATRIC NEGATIVE: 1
ENDOCRINE NEGATIVE: 1
GASTROINTESTINAL NEGATIVE: 1
NEUROLOGICAL NEGATIVE: 1
MUSCULOSKELETAL NEGATIVE: 1

## 2023-12-21 ASSESSMENT — COLUMBIA-SUICIDE SEVERITY RATING SCALE - C-SSRS
6. HAVE YOU EVER DONE ANYTHING, STARTED TO DO ANYTHING, OR PREPARED TO DO ANYTHING TO END YOUR LIFE?: NO
2. HAVE YOU ACTUALLY HAD ANY THOUGHTS OF KILLING YOURSELF?: NO
1. IN THE PAST MONTH, HAVE YOU WISHED YOU WERE DEAD OR WISHED YOU COULD GO TO SLEEP AND NOT WAKE UP?: NO

## 2023-12-21 ASSESSMENT — ACTIVITIES OF DAILY LIVING (ADL): LACK_OF_TRANSPORTATION: NO

## 2023-12-21 ASSESSMENT — PAIN DESCRIPTION - FREQUENCY: FREQUENCY: INTERMITTENT

## 2023-12-21 ASSESSMENT — COGNITIVE AND FUNCTIONAL STATUS - GENERAL
PATIENT BASELINE BEDBOUND: NO
MOBILITY SCORE: 24
DAILY ACTIVITIY SCORE: 24

## 2023-12-21 ASSESSMENT — PAIN SCALES - GENERAL
PAINLEVEL_OUTOF10: 0 - NO PAIN
PAINLEVEL_OUTOF10: 0 - NO PAIN
PAINLEVEL_OUTOF10: 6

## 2023-12-21 ASSESSMENT — PAIN DESCRIPTION - LOCATION: LOCATION: CHEST

## 2023-12-21 ASSESSMENT — PAIN - FUNCTIONAL ASSESSMENT: PAIN_FUNCTIONAL_ASSESSMENT: 0-10

## 2023-12-21 ASSESSMENT — PAIN DESCRIPTION - DESCRIPTORS
DESCRIPTORS: PRESSURE
DESCRIPTORS: PRESSURE

## 2023-12-21 ASSESSMENT — PATIENT HEALTH QUESTIONNAIRE - PHQ9
2. FEELING DOWN, DEPRESSED OR HOPELESS: NOT AT ALL
1. LITTLE INTEREST OR PLEASURE IN DOING THINGS: NOT AT ALL
SUM OF ALL RESPONSES TO PHQ9 QUESTIONS 1 & 2: 0

## 2023-12-21 ASSESSMENT — PAIN DESCRIPTION - PAIN TYPE: TYPE: ACUTE PAIN

## 2023-12-21 ASSESSMENT — PAIN DESCRIPTION - ORIENTATION: ORIENTATION: MID

## 2023-12-21 ASSESSMENT — PAIN DESCRIPTION - PROGRESSION: CLINICAL_PROGRESSION: NOT CHANGED

## 2023-12-21 ASSESSMENT — PAIN DESCRIPTION - ONSET: ONSET: AWAKENED FROM SLEEP

## 2023-12-21 NOTE — ED PROVIDER NOTES
HPI   Chief Complaint   Patient presents with   • Chest Pain     I started having chest pressure mid sternal at 1100 when I put a ladder on the house to get to the roof and when I went inside the house I felt lightheaded        63-year-old male presented emergency department the chief complaint of midsternal chest discomfort that occurred when he got up a ladder earlier today.  He denies history of coronary artery disease.  Denies prior heart attack.  He denies radiation of the pain.  He denies shortness of breath nausea vomiting diaphoresis.  He did see his cardiologist in the office today about the chest discomfort and was referred to the emergency department.  He denies any fall or injury or trauma.  Nontoxic-appearing.                          [place] Coma Scale Score: 15                  Patient History   No past medical history on file.  Past Surgical History:   Procedure Laterality Date   • MR HEAD ANGIO WO IV CONTRAST  8/28/2016    MR HEAD ANGIO WO IV CONTRAST [facility]   • MR HEAD ANGIO WO IV CONTRAST  12/26/2020    MR HEAD ANGIO WO IV CONTRAST [facility]     Family History   Problem Relation Name Age of Onset   • No Known Problems Mother     • No Known Problems Father       Social History     Tobacco Use   • Smoking status: Never   • Smokeless tobacco: Never   Substance Use Topics   • Alcohol use: Not Currently   • Drug use: Never       Physical Exam   ED Triage Vitals [12/21/23 1651]   Temp Heart Rate Resp BP   36.6 °C (97.9 °F) 91 18 132/82      SpO2 Temp Source Heart Rate Source Patient Position   97 % Oral Monitor Sitting      BP Location FiO2 (%)     Right arm --       Physical Exam  Vitals and nursing note reviewed.   Constitutional:       Appearance: He is well-developed.   HENT:      Head: Normocephalic and atraumatic.   Cardiovascular:      Rate and Rhythm: Normal rate and regular rhythm.      Heart sounds: Normal heart sounds.   Pulmonary:      Effort: Pulmonary effort is  normal.   Abdominal:      General: Bowel sounds are normal.      Palpations: Abdomen is soft.   Musculoskeletal:         General: Normal range of motion.      Cervical back: Normal range of motion.   Skin:     General: Skin is warm and dry.   Neurological:      General: No focal deficit present.      Mental Status: He is alert.   Psychiatric:         Mood and Affect: Mood normal.         Behavior: Behavior normal.         ED Course & MDM   Diagnoses as of 12/21/23 2106   Chest pain, unspecified type   Acute renal injury (CMS/HCC)       Medical Decision Making  I have seen and evaluated this patient.  The attending physician has also seen and evaluated this patient.  Vital signs, laboratory testing and diagnostic images if applicable have been reviewed.  All laboratory and imaging is interpreted by myself unless otherwise stated.  Radiology studies are also formally interpreted by radiologist.    CBC without significant leukocytosis or anemia metabolic panel reveals an acute kidney injury with creatinine 2.0 with baseline in the 1 range.  There are no significant electrolyte abnormality.  Troponins are 21 and 23.  EKG acutely nonischemic.  CT abdomen does not reveal obstruction.  Patient will be admitted for further evaluation of his acute renal impairment.    Labs Reviewed  COMPREHENSIVE METABOLIC PANEL - Abnormal     Glucose                       128 (*)                Sodium                        138                    Potassium                     4.8                    Chloride                      105                    Bicarbonate                   24                     Urea Nitrogen                 19                     Creatinine                    2.00 (*)               eGFR                          37 (*)                 Calcium                       9.4                    Albumin                       3.9                    Alkaline Phosphatase          58                     Total Protein                  6.7                    AST                           24                     Bilirubin, Total              0.4                    ALT                           35                     Anion Gap                     9                   SERIAL TROPONIN, INITIAL ([facility]) - Abnormal     Troponin T, High Sensitivity   23 (*)              SERIAL TROPONIN,  2 HOUR ([facility]) - Abnormal     Troponin T, High Sensitivity   21 (*)              LIPASE - Normal     Lipase                        25                  CBC WITH AUTO DIFFERENTIAL     WBC                           7.9                    nRBC                          0.0                    RBC                           5.07                   Hemoglobin                    16.5                   Hematocrit                    46.3                   MCV                           91                     MCH                           32.5                   MCHC                          35.6                   RDW                           12.7                   Platelets                     168                    Neutrophils %                 60.2                   Immature Granulocytes %, Automated   0.4                    Lymphocytes %                 28.8                   Monocytes %                   8.1                    Eosinophils %                 1.9                    Basophils %                   0.6                    Neutrophils Absolute          4.76                   Immature Granulocytes Absolute, Au*   0.03                   Lymphocytes Absolute          2.28                   Monocytes Absolute            0.64                   Eosinophils Absolute          0.15                   Basophils Absolute            0.05                TROPONIN T SERIES, HIGH SENSITIVITY (0, 2 HR, 6 HR)         Narrative: The following orders were created for panel order Troponin T Series, High Sensitivity (0, 2HR, 6HR).                  Procedure                                Abnormality         Status                                     ---------                               -----------         ------                                     Serial Troponin, Initial...[[id number]]  Abnormal            Final result                               Serial Troponin, 2 Hour ...[[id number]]  Abnormal            Final result                               Serial Troponin, 6 Hour ...[[id number]]                                                                                   Please view results for these tests on the individual orders.  SERIAL TROPONIN, 6 HOUR ([facility])  CT abdomen pelvis wo IV contrast   Final Result    No acute abdominal or pelvic pathology.          MACRO:    None.          Signed by: Harrison Monzon 12/21/2023 8:28 PM    Dictation workstation:   [id number]     XR chest 2 views   Final Result    No acute cardiopulmonary process.                      Signed by: Harrison Monzon 12/21/2023 5:28 PM    Dictation workstation:   [id number]             Procedure  Procedures     Altaf Frank PA-C  12/21/23 0954

## 2023-12-22 VITALS
SYSTOLIC BLOOD PRESSURE: 109 MMHG | RESPIRATION RATE: 18 BRPM | DIASTOLIC BLOOD PRESSURE: 68 MMHG | OXYGEN SATURATION: 99 % | HEIGHT: 69 IN | TEMPERATURE: 97.9 F | WEIGHT: 263.89 LBS | HEART RATE: 66 BPM | BODY MASS INDEX: 39.09 KG/M2

## 2023-12-22 LAB
ANION GAP SERPL CALC-SCNC: 10 MMOL/L
BUN SERPL-MCNC: 20 MG/DL (ref 8–25)
CALCIUM SERPL-MCNC: 9.3 MG/DL (ref 8.5–10.4)
CHLORIDE SERPL-SCNC: 106 MMOL/L (ref 97–107)
CO2 SERPL-SCNC: 23 MMOL/L (ref 24–31)
CREAT SERPL-MCNC: 1.5 MG/DL (ref 0.4–1.6)
ERYTHROCYTE [DISTWIDTH] IN BLOOD BY AUTOMATED COUNT: 12.8 % (ref 11.5–14.5)
GFR SERPL CREATININE-BSD FRML MDRD: 52 ML/MIN/1.73M*2
GLUCOSE SERPL-MCNC: 117 MG/DL (ref 65–99)
HCT VFR BLD AUTO: 45.2 % (ref 41–52)
HGB BLD-MCNC: 16 G/DL (ref 13.5–17.5)
MCH RBC QN AUTO: 32.7 PG (ref 26–34)
MCHC RBC AUTO-ENTMCNC: 35.4 G/DL (ref 32–36)
MCV RBC AUTO: 92 FL (ref 80–100)
NRBC BLD-RTO: 0 /100 WBCS (ref 0–0)
PLATELET # BLD AUTO: 153 X10*3/UL (ref 150–450)
POTASSIUM SERPL-SCNC: 4.2 MMOL/L (ref 3.4–5.1)
RBC # BLD AUTO: 4.9 X10*6/UL (ref 4.5–5.9)
SODIUM SERPL-SCNC: 139 MMOL/L (ref 133–145)
TROPONIN T SERPL-MCNC: 21 NG/L
WBC # BLD AUTO: 7.7 X10*3/UL (ref 4.4–11.3)

## 2023-12-22 PROCEDURE — 36415 COLL VENOUS BLD VENIPUNCTURE: CPT | Performed by: PHYSICIAN ASSISTANT

## 2023-12-22 PROCEDURE — 99221 1ST HOSP IP/OBS SF/LOW 40: CPT | Performed by: INTERNAL MEDICINE

## 2023-12-22 PROCEDURE — 2500000001 HC RX 250 WO HCPCS SELF ADMINISTERED DRUGS (ALT 637 FOR MEDICARE OP): Performed by: NURSE PRACTITIONER

## 2023-12-22 PROCEDURE — 80048 BASIC METABOLIC PNL TOTAL CA: CPT | Performed by: NURSE PRACTITIONER

## 2023-12-22 PROCEDURE — 36415 COLL VENOUS BLD VENIPUNCTURE: CPT | Performed by: NURSE PRACTITIONER

## 2023-12-22 PROCEDURE — 2500000002 HC RX 250 W HCPCS SELF ADMINISTERED DRUGS (ALT 637 FOR MEDICARE OP, ALT 636 FOR OP/ED): Performed by: NURSE PRACTITIONER

## 2023-12-22 PROCEDURE — 99232 SBSQ HOSP IP/OBS MODERATE 35: CPT | Performed by: NURSE PRACTITIONER

## 2023-12-22 PROCEDURE — 85027 COMPLETE CBC AUTOMATED: CPT | Performed by: NURSE PRACTITIONER

## 2023-12-22 PROCEDURE — 96372 THER/PROPH/DIAG INJ SC/IM: CPT | Performed by: NURSE PRACTITIONER

## 2023-12-22 PROCEDURE — 94760 N-INVAS EAR/PLS OXIMETRY 1: CPT

## 2023-12-22 PROCEDURE — 84484 ASSAY OF TROPONIN QUANT: CPT | Performed by: PHYSICIAN ASSISTANT

## 2023-12-22 PROCEDURE — 2500000004 HC RX 250 GENERAL PHARMACY W/ HCPCS (ALT 636 FOR OP/ED): Performed by: NURSE PRACTITIONER

## 2023-12-22 PROCEDURE — G0378 HOSPITAL OBSERVATION PER HR: HCPCS

## 2023-12-22 RX ADMIN — ASPIRIN 81 MG: 81 TABLET, CHEWABLE ORAL at 00:10

## 2023-12-22 RX ADMIN — RANOLAZINE 500 MG: 500 TABLET, EXTENDED RELEASE ORAL at 00:17

## 2023-12-22 RX ADMIN — RANOLAZINE 500 MG: 500 TABLET, EXTENDED RELEASE ORAL at 11:22

## 2023-12-22 RX ADMIN — ATORVASTATIN CALCIUM 40 MG: 40 TABLET, FILM COATED ORAL at 11:23

## 2023-12-22 RX ADMIN — METOPROLOL SUCCINATE 25 MG: 25 TABLET, EXTENDED RELEASE ORAL at 11:16

## 2023-12-22 RX ADMIN — ENOXAPARIN SODIUM 40 MG: 40 INJECTION SUBCUTANEOUS at 00:10

## 2023-12-22 RX ADMIN — FENOFIBRATE 54 MG: 54 TABLET ORAL at 11:22

## 2023-12-22 RX ADMIN — DULOXETINE HYDROCHLORIDE 60 MG: 60 CAPSULE, DELAYED RELEASE ORAL at 11:15

## 2023-12-22 RX ADMIN — ISOSORBIDE MONONITRATE 60 MG: 60 TABLET, EXTENDED RELEASE ORAL at 11:16

## 2023-12-22 RX ADMIN — MODAFINIL 50 MG: 100 TABLET ORAL at 11:15

## 2023-12-22 ASSESSMENT — COGNITIVE AND FUNCTIONAL STATUS - GENERAL
MOBILITY SCORE: 24
DAILY ACTIVITIY SCORE: 24

## 2023-12-22 ASSESSMENT — PAIN SCALES - PAIN ASSESSMENT IN ADVANCED DEMENTIA (PAINAD)
TOTALSCORE: 0
BODYLANGUAGE: RELAXED
BREATHING: NORMAL
FACIALEXPRESSION: SMILING OR INEXPRESSIVE
CONSOLABILITY: NO NEED TO CONSOLE

## 2023-12-22 ASSESSMENT — PAIN SCALES - GENERAL
PAINLEVEL_OUTOF10: 0 - NO PAIN

## 2023-12-22 ASSESSMENT — PAIN SCALES - WONG BAKER
WONGBAKER_NUMERICALRESPONSE: NO HURT

## 2023-12-22 NOTE — ED PROVIDER NOTES
Supervisory note:  Patient seen in conjunction with DEVIN Lopez.  Patient presents with a sense of chest pressure.  He reports that it feels as though a washing machine agitater is flipping around in his lower chest.  He does see a cardiologist, Dr. Alvarado, who did call the emergency department prior to patient's arrival.  Patient reports that his symptoms lasted for about 4 hours and have since completely resolved.  On examination, heart and lung sounds are unremarkable.    EKG interpreted by me: Sinus rhythm with first-degree AV block, rate 90.  Leftward axis.  LAFB with right bundle branch block.  No significant ST or T wave abnormalities.    Laboratory studies reveal indeterminate troponin but is stable upon recheck.  Patient is felt to be at low risk for major adverse cardiac event in the next 6 weeks by heart score.  PE, aortic dissection, esophageal perforation, pneumothorax felt to be very unlikely.  Laboratory studies however notable for substantially elevated creatinine compared with baseline, most recently checked only 4 months ago.  On further questioning, patient does have significant history of renal disease.  In the setting, patient accepted to hospitalist service for further management of acute renal injury.  Imaging studies did not reveal any signs of renal obstruction.    I personally saw the patient and made/approved the management plan and take responsiblity for the patient management.  Parts of this chart were completed with dictation software, please excuse any errors in transcription.     Lonnie Ventura MD  12/21/23 4724

## 2023-12-22 NOTE — PROGRESS NOTES
Jace Villa is a 63 y.o. male on day 0 of admission presenting with Acute kidney injury (CMS/HCC).      Subjective   Admitted yesterday for chest pain. Pt sitting on edge of bed. Denies chest pain. Troponins flat. Awaiting Cardiology.        Objective     Last Recorded Vitals  /80 (BP Location: Right arm)   Pulse 68   Temp 36.3 °C (97.3 °F) (Oral)   Resp 18   Wt 120 kg (263 lb 14.3 oz)   SpO2 94%   Intake/Output last 3 Shifts:  No intake or output data in the 24 hours ending 12/22/23 1007    Admission Weight  Weight: 120 kg (263 lb 14.3 oz) (12/21/23 1651)    Daily Weight  12/21/23 : 120 kg (263 lb 14.3 oz)    Image Results  CT abdomen pelvis wo IV contrast  Narrative: Interpreted By:  Harrison Monzon,   STUDY:  CT ABDOMEN PELVIS WO IV CONTRAST; 12/21/2023 8:09 pm      INDICATION:  Signs/Symptoms:abdominal pain, renal impairment;      COMPARISON:  None      ACCESSION NUMBER(S):  [id number]      ORDERING CLINICIAN:  HELEN STANTON      TECHNIQUE:  Contiguous axial images of the abdomen/pelvis were performed.  All CT examinations are performed with 1 or more of the following  dose reduction techniques: Automated exposure control, adjustment of  mA and/or kv according to patient's size, or use of iterative  reconstruction techniques.      FINDINGS:  Evaluation of the solid and hollow viscera are somewhat limited  without the administration of intravenous or oral contrast.      The liver, gallbladder,  common bile duct, pancreas, spleen, and  adrenal glands are unremarkable.      The kidneys are grossly unremarkable. No urolithiasis is seen.  No  hydroureteronephrosis is seen.      The visualized aorta is unremarkable.      The small bowel is not dilated. The appendix is within normal limits.      No free intraperitoneal air or fluid is seen.      The bladder is well distended with no gross wall thickening.      The visualized osseous structures are intact.      Limited images of the lower thorax are  unremarkable.      Impression: No acute abdominal or pelvic pathology.      MACRO:  None.      Signed by: Harrison Monzon 12/21/2023 8:28 PM  Dictation workstation:   [id number]  XR chest 2 views  Narrative: Interpreted By:  Harrison Monzon,   STUDY:  XR CHEST 2 VIEWS; 12/21/2023 5:25 pm      INDICATION:  Signs/Symptoms:cp      COMPARISON:  04/03/2023      ACCESSION NUMBER(S):  [id number]      ORDERING CLINICIAN:  HELEN STANTON      TECHNIQUE:  Frontal and lateral chest radiographs.      FINDINGS:  The cardiomediastinal silhouette is unremarkable. The lungs are  clear. No pleural effusion is identified.      The osseous structures are intact.      Impression: No acute cardiopulmonary process.              Signed by: Harrison Monzon 12/21/2023 5:28 PM  Dictation workstation:   [id number]      Physical Exam  HENT:      Head: Normocephalic and atraumatic.      Nose: Nose normal.      Mouth/Throat:      Mouth: Mucous membranes are moist.      Pharynx: Oropharynx is clear.   Eyes:      Extraocular Movements: Extraocular movements intact.      Pupils: Pupils are equal, round, and reactive to light.   Cardiovascular:      Rate and Rhythm: Normal rate and regular rhythm.      Pulses: Normal pulses.   Pulmonary:      Effort: Pulmonary effort is normal.      Breath sounds: Normal breath sounds.   Abdominal:      General: Abdomen is flat. Bowel sounds are normal.      Palpations: Abdomen is soft.   Musculoskeletal:         General: Normal range of motion.   Skin:     General: Skin is warm and dry.      Capillary Refill: Capillary refill takes less than 2 seconds.   Neurological:      General: No focal deficit present.      Mental Status: He is oriented to person, place, and time.   Psychiatric:         Mood and Affect: Mood normal.       Results for orders placed or performed during the hospital encounter of 12/21/23 (from the past 24 hour(s))   CBC and Auto Differential   Result Value Ref Range    WBC 7.9 4.4 - 11.3  x10*3/uL    nRBC 0.0 0.0 - 0.0 /100 WBCs    RBC 5.07 4.50 - 5.90 x10*6/uL    Hemoglobin 16.5 13.5 - 17.5 g/dL    Hematocrit 46.3 41.0 - 52.0 %    MCV 91 80 - 100 fL    MCH 32.5 26.0 - 34.0 pg    MCHC 35.6 32.0 - 36.0 g/dL    RDW 12.7 11.5 - 14.5 %    Platelets 168 150 - 450 x10*3/uL    Neutrophils % 60.2 40.0 - 80.0 %    Immature Granulocytes %, Automated 0.4 0.0 - 0.9 %    Lymphocytes % 28.8 13.0 - 44.0 %    Monocytes % 8.1 2.0 - 10.0 %    Eosinophils % 1.9 0.0 - 6.0 %    Basophils % 0.6 0.0 - 2.0 %    Neutrophils Absolute 4.76 1.20 - 7.70 x10*3/uL    Immature Granulocytes Absolute, Automated 0.03 0.00 - 0.70 x10*3/uL    Lymphocytes Absolute 2.28 1.20 - 4.80 x10*3/uL    Monocytes Absolute 0.64 0.10 - 1.00 x10*3/uL    Eosinophils Absolute 0.15 0.00 - 0.70 x10*3/uL    Basophils Absolute 0.05 0.00 - 0.10 x10*3/uL   Comprehensive metabolic panel   Result Value Ref Range    Glucose 128 (H) 65 - 99 mg/dL    Sodium 138 133 - 145 mmol/L    Potassium 4.8 3.4 - 5.1 mmol/L    Chloride 105 97 - 107 mmol/L    Bicarbonate 24 24 - 31 mmol/L    Urea Nitrogen 19 8 - 25 mg/dL    Creatinine 2.00 (H) 0.40 - 1.60 mg/dL    eGFR 37 (L) >60 mL/min/1.73m*2    Calcium 9.4 8.5 - 10.4 mg/dL    Albumin 3.9 3.5 - 5.0 g/dL    Alkaline Phosphatase 58 35 - 125 U/L    Total Protein 6.7 5.9 - 7.9 g/dL    AST 24 5 - 40 U/L    Bilirubin, Total 0.4 0.1 - 1.2 mg/dL    ALT 35 5 - 40 U/L    Anion Gap 9 <=19 mmol/L   Lipase   Result Value Ref Range    Lipase 25 16 - 63 U/L   Serial Troponin, Initial ([facility])   Result Value Ref Range    Troponin T, High Sensitivity 23 (H) <=15 ng/L   Serial Troponin, 2 Hour ([facility])   Result Value Ref Range    Troponin T, High Sensitivity 21 (H) <=15 ng/L   Serial Troponin, 6 Hour ([facility])   Result Value Ref Range    Troponin T, High Sensitivity 21 (H) <=15 ng/L   CBC   Result Value Ref Range    WBC 7.7 4.4 - 11.3 x10*3/uL    nRBC 0.0 0.0 - 0.0 /100 WBCs    RBC 4.90 4.50 - 5.90 x10*6/uL    Hemoglobin 16.0 13.5 - 17.5 g/dL     Hematocrit 45.2 41.0 - 52.0 %    MCV 92 80 - 100 fL    MCH 32.7 26.0 - 34.0 pg    MCHC 35.4 32.0 - 36.0 g/dL    RDW 12.8 11.5 - 14.5 %    Platelets 153 150 - 450 x10*3/uL   Basic metabolic panel   Result Value Ref Range    Glucose 117 (H) 65 - 99 mg/dL    Sodium 139 133 - 145 mmol/L    Potassium 4.2 3.4 - 5.1 mmol/L    Chloride 106 97 - 107 mmol/L    Bicarbonate 23 (L) 24 - 31 mmol/L    Urea Nitrogen 20 8 - 25 mg/dL    Creatinine 1.50 0.40 - 1.60 mg/dL    eGFR 52 (L) >60 mL/min/1.73m*2    Calcium 9.3 8.5 - 10.4 mg/dL    Anion Gap 10 <=19 mmol/L   Assessment/Plan   Chest pain  -Follows with Alvarado  -Echo 12/6/23 LVEF 50%   -NM stress test 2019 with small area of reversible ischemia    -Resolved. Troponins flat  -Cardiology to see    Acute kidney injury   -Creatinine almost back to baseline              -Nephrology saw and pt cleared for discharge    Plan  Discharge when okay with Dr. Van to home with no needs                 Vandana Sam, APRN-CNP

## 2023-12-22 NOTE — DISCHARGE INSTRUCTIONS
You should follow-up with your primary care physician and cardiologist.  Return to the emergency department with any worsening symptoms.    It is important to remember that your care does not end here and you must continue to monitor your condition closely. Please return to the emergency department for any worsening or concerning signs or symptoms as directed by our conversations and the discharge instructions. If you do not have a doctor please contact the referral number on your discharge instructions. Please contact any physician specialists provided in your discharge notes as it is very important to follow up with them regarding your condition. If you are unable to reach the physicians provided, please come back to the Emergency Department at any time.    Return to emergency room without delay for ANY new or worsening pains or for any other symptoms or concerns.  Return with worsening pains, nausea, vomiting, trouble breathing, palpitations, shortness of breath, inability to pass stool or urine, loss of control of stool or urine, any numbness or tingling (that is not normal for you), uncontrolled fevers, the passing of blood or other material in stool or urine, rashes, pains or for any other symptoms or concerns you may have.  You are always welcome to return to the ER at any time for any reason or for any other concerns you may have.

## 2023-12-22 NOTE — CARE PLAN
The patient's goals for the shift include      The clinical goals for the shift include updated    Over the shift, the patient did not make progress toward the following goals. Barriers to progression include   Problem: Safety  Goal: Patient will be injury free during hospitalization  Outcome: Progressing   . Recommendations to address these barriers include   Problem: Daily Care  Goal: Daily care needs are met  Outcome: Progressing   .

## 2023-12-22 NOTE — CONSULTS
Inpatient consult to Cardiology  Consult performed by: Phu Van MD  Consult ordered by: CINDY Ca-CNP        History Of Present Illness:    Jace Villa is a 63 y.o. male   Patient with multiple cardiac risk factors including hypertension hyperlipidemia patient had a questionable history of stroke in the past history of sleep apnea in the past had seen Dr. Alvarado recently.  Patient recently was climbing a ladder to clean the gutters on the roof of the house.  Patient came down from the roof feeling palpitation fluttering.  No active chest pain tightness now here with a atypical symptoms of chest pain  Patient went to see Dr. Alvarado who sent the patient to ER for further evaluation.  At the moment patient's son is sitting at bedside.  No active angina or CHF signs symptoms.  Troponins are essentially minimal positive.  Patient does fairly active no chest pain or tightness with more activity.  He has been  Last Recorded Vitals:  Vitals:    12/22/23 0032 12/22/23 0429 12/22/23 0728 12/22/23 1119   BP: 103/66 112/70 111/80 109/68   BP Location: Right arm Right arm Right arm Left arm   Patient Position: Lying Lying  Sitting   Pulse: 64 69 68 66   Resp: 18 18 18 18   Temp:   36.3 °C (97.3 °F) 36.6 °C (97.9 °F)   TempSrc:   Oral Oral   SpO2: 97% 96% 94% 99%   Weight:       Height:           Past Medical History:  He has a past medical history of Asthma, Hemochromatosis, Hypertension, Sleep apnea, and Stroke (CMS/[facility]).    Past Surgical History:  He has a past surgical history that includes MR angio head wo IV contrast (08/28/2016); MR angio head wo IV contrast (12/26/2020); and Knee arthroscopy w/ debridement (Right).      Social History:  He reports that he has quit smoking. His smoking use included cigarettes. He has never used smokeless tobacco. He reports current alcohol use. He reports that he does not use drugs.    Family History:  Family History   Problem Relation Name Age of Onset     Heart disease Mother      Lung disease Mother      Diabetes Father      Kidney failure Father      No Known Problems Sister      No Known Problems Sister          Allergies:  Patient has no known allergies.    Inpatient Medications:  Scheduled medications   Medication Dose Route Frequency    atorvastatin  40 mg oral Daily    DULoxetine  30 mg oral Daily before evening meal    DULoxetine  60 mg oral Daily    enoxaparin  40 mg subcutaneous q24h    fenofibrate  54 mg oral Daily    isosorbide mononitrate ER  60 mg oral Daily    metoprolol succinate XL  25 mg oral Daily    modafinil  50 mg oral q AM    ranolazine  500 mg oral BID     Outpatient Medications:  Current Outpatient Medications   Medication Instructions    albuterol 90 mcg/actuation inhaler inhalation    aspirin 81 mg capsule Every 24 hours    atorvastatin (LIPITOR) 40 mg, oral, Daily    cholecalciferol, vitamin D3, (VITAMIN D3 ORAL) 400 mg, oral, Daily    DOCOSAHEXAENOIC ACID ORAL 1,200 g, oral, 2 times daily    DULoxetine (CYMBALTA) 30 mg, oral, Daily, With 60 mg tcap. Total daily dose 90 mg    DULoxetine (CYMBALTA) 60 mg, oral, Daily    fenofibrate (Tricor) 54 mg tablet TAKE 1 TABLET BY MOUTH EVERY DAY WITH FOOD FOR 90 DAYS    isosorbide mononitrate ER (IMDUR) 60 mg, oral, Daily, Do not crush or chew.    metoprolol succinate XL (TOPROL-XL) 25 mg, oral, Daily    modafinil (PROVIGIL) 50 mg, oral, Every morning, Call sleep nurse for refills and questions  [phone]. Cancel prior script change in dose    ranolazine (RANEXA) 500 mg, oral, 2 times daily, Do not crush, chew, or split.    Sunosi 150 mg tablet TAKE ONE TABLET UPON WAKING. CALL SLEEP NURSE FOR REFILLS        Physical Exam:  HEENT: Normocephalic/atraumatic pupils equal react light  Neck exam mild JVD, no bruit  Lung exam clear to auscultation, few crackles at the bases  Cardiac exam is regular rhythm S1-S2, soft slight murmur heard.  No S3 heard.  Abdomen soft nontender,  nondistended  Extremities no clubbing, cyanosis but trace edema  Neuro exam grossly intact.  Last Labs:  CBC - 12/22/2023:  4:34 AM  7.7 16.0 153    45.2      CMP - 12/22/2023:  4:34 AM  9.3 6.7 24 --- 0.4   _ 3.9 35 58      PTT - No results in last year.  _   _ _     BNP   Date/Time Value Ref Range Status   12/15/2023 08:28 AM 12 0 - 99 pg/mL Final     LDL Calculated   Date/Time Value Ref Range Status   07/26/2023 09:55 AM 67 65 - 130 MG/DL Final   02/03/2022 09:10 AM 67 65 - 130 MG/DL Final   12/25/2020 08:13 PM 37 (L) 65 - 130 MG/DL Final          CT abdomen pelvis wo IV contrast  Narrative: Interpreted By:  Harrison Monzon,   STUDY:  CT ABDOMEN PELVIS WO IV CONTRAST; 12/21/2023 8:09 pm      INDICATION:  Signs/Symptoms:abdominal pain, renal impairment;      COMPARISON:  None      ACCESSION NUMBER(S):  [id number]      ORDERING CLINICIAN:  HELEN STANTON      TECHNIQUE:  Contiguous axial images of the abdomen/pelvis were performed.  All CT examinations are performed with 1 or more of the following  dose reduction techniques: Automated exposure control, adjustment of  mA and/or kv according to patient's size, or use of iterative  reconstruction techniques.      FINDINGS:  Evaluation of the solid and hollow viscera are somewhat limited  without the administration of intravenous or oral contrast.      The liver, gallbladder,  common bile duct, pancreas, spleen, and  adrenal glands are unremarkable.      The kidneys are grossly unremarkable. No urolithiasis is seen.  No  hydroureteronephrosis is seen.      The visualized aorta is unremarkable.      The small bowel is not dilated. The appendix is within normal limits.      No free intraperitoneal air or fluid is seen.      The bladder is well distended with no gross wall thickening.      The visualized osseous structures are intact.      Limited images of the lower thorax are unremarkable.      Impression: No acute abdominal or pelvic pathology.      MACRO:  None.       Signed by: Harrison Monzon 12/21/2023 8:28 PM  Dictation workstation:   [id number]  XR chest 2 views  Narrative: Interpreted By:  Harrison Monzon,   STUDY:  XR CHEST 2 VIEWS; 12/21/2023 5:25 pm      INDICATION:  Signs/Symptoms:cp      COMPARISON:  04/03/2023      ACCESSION NUMBER(S):  [id number]      ORDERING CLINICIAN:  HELEN STANTON      TECHNIQUE:  Frontal and lateral chest radiographs.      FINDINGS:  The cardiomediastinal silhouette is unremarkable. The lungs are  clear. No pleural effusion is identified.      The osseous structures are intact.      Impression: No acute cardiopulmonary process.              Signed by: Harrison Monzon 12/21/2023 5:28 PM  Dictation workstation:   [id number]      Principal Problem:    Acute kidney injury (CMS/HCC)  Active Problems:    Hemochromatosis    Hyperlipidemia    Hypertension    Obstructive sleep apnea syndrome    Chest pain, unspecified type    Assessment/Plan   Patient with a history of obesity, hypertension hyperlipidemia atypical chest pain palpitation fluttering.  Recent echo showed essentially normal ejection fraction minimal positive troponin.  Also underlying SPEEDY.  No active angina CHF sign symptoms.  Continue current statin therapy, metoprolol Imdur and Ranexa.  Outpatient stress test with Dr. Alvarado, modify risk factor.   Critical care time is spent at bedside includes review of diagnostic tests, labs, and radiographs, serial assessments and management of hemodynamics, EKGs, old echoes, cardiac work-up and coordination of care.  Assessment, impression and plans are reflected in the note above as well as the orders.  Advised patient to avoid lunch meats, canned soups, pizzas, bread rolls, and sandwiches. Advised patient to limit salt intake 1,500 mg daily. Advised patient to exercise 30 mins/3 times a week including treadmill or aerobic type, Goal to achieve 65% target HR.  Diet and exercise reviewed with patient..advice to walk about 10,000 steps or  about 2 hours during day time. Cut back on salt, sugar and flour.  Advised patient to check blood pressure twice a day for next 10 days and give us a call if her blood pressure remains above 170 systolic and diastolic above 95.  Meanwhile cut back on salt, caffeine.  If blood pressure persistently stays above 200 systolic then go to nearest emergency room or call 911.      Code Status:  Full Code  I spent 60 minutes in the professional and overall care of this patient.  Phu Van MD

## 2023-12-22 NOTE — DISCHARGE SUMMARY
Discharge Diagnosis  Acute kidney injury (CMS/HCC)    Issues Requiring Follow-Up  Chest pain    Discharge Meds     Your medication list        ASK your doctor about these medications        Instructions Last Dose Given Next Dose Due   albuterol 90 mcg/actuation inhaler           ALBUTEROL INHL           ascorbic acid 1,000 mg tablet  Commonly known as: Vitamin C           aspirin 81 mg EC tablet           aspirin 81 mg capsule           atorvastatin 40 mg tablet  Commonly known as: Lipitor      TAKE 1 TABLET BY MOUTH EVERY DAY FOR 90 DAYS       butalbital-acetaminophen-caff -40 mg tablet           DOCOSAHEXAENOIC ACID ORAL           DULoxetine 30 mg DR capsule  Commonly known as: Cymbalta      Take 1 capsule (30 mg) by mouth once daily. With 60 mg tcap. Total daily dose 90 mg       DULoxetine 60 mg DR capsule  Commonly known as: Cymbalta      Take 1 capsule (60 mg) by mouth once daily.       fenofibrate 54 mg tablet  Commonly known as: Tricor      TAKE 1 TABLET BY MOUTH EVERY DAY WITH FOOD FOR 90 DAYS       FISH OIL ORAL           Flowflex COVID-19 Ag Home Test kit  Generic drug: COVID-19 antigen test           gabapentin 100 mg capsule  Commonly known as: Neurontin      TAKE 1 CAPSULE BY MOUTH EVERY 6 HOURS 30 DAYS       loratadine 10 mg capsule           loratadine 10 mg capsule           METOPROLOL SUCCINATE ORAL           metoprolol succinate XL 25 mg 24 hr tablet  Commonly known as: Toprol-XL      TAKE 1 TABLET BY MOUTH EVERY DAY FOR 90 DAYS       modafinil 100 mg tablet  Commonly known as: Provigil      Take 0.5 tablets (50 mg) by mouth once daily in the morning. Call sleep nurse for refills and questions  [phone]. Cancel prior script change in dose       MULTIVITAMIN ORAL           nabumetone 500 mg tablet  Commonly known as: Relafen           simvastatin 40 mg tablet  Commonly known as: Zocor           Sunosi 150 mg tablet  Generic drug: solriamfetoL      TAKE ONE TABLET UPON WAKING. CALL SLEEP  NURSE FOR REFILLS                 Test Results Pending At Discharge  Pending Labs       No current pending labs.            Hospital Course   Admitted for chest pain. Troponins flat. No EKG changes. Pt was evaluated by Cardiology and cleared for discharge. To follow up with his Cardiologist, Dr. Alvarado for possible outpatient stress test. Medically stable for discharge.      Pertinent Physical Exam At Time of Discharge  Physical Exam  HENT:      Head: Normocephalic and atraumatic.      Nose: Nose normal.      Mouth/Throat:      Mouth: Mucous membranes are moist.      Pharynx: Oropharynx is clear.   Eyes:      Extraocular Movements: Extraocular movements intact.      Pupils: Pupils are equal, round, and reactive to light.   Cardiovascular:      Rate and Rhythm: Normal rate and regular rhythm.      Pulses: Normal pulses.   Pulmonary:      Effort: Pulmonary effort is normal.      Breath sounds: Normal breath sounds.   Abdominal:      General: Abdomen is flat. Bowel sounds are normal.      Palpations: Abdomen is soft.   Musculoskeletal:         General: Normal range of motion.   Skin:     General: Skin is warm and dry.      Capillary Refill: Capillary refill takes less than 2 seconds.   Neurological:      General: No focal deficit present.      Mental Status: He is oriented to person, place, and time.   Psychiatric:         Mood and Affect: Mood normal.         Outpatient Follow-Up  Future Appointments   Date Time Provider Department Center   1/9/2024 11:00 AM Ashley Villasenor MD [id number] [facility]   1/10/2024  2:00 PM Natalee Ortega MD [id number] [facility]   1/17/2024 11:30 AM Natalee Ortega MD [id number] [facility]   5/1/2024  1:00 PM Franko Hartman MD [id number] [facility]         Vandana Sam, APRN-CNP

## 2023-12-22 NOTE — H&P
History Of Present Illness  Jace Villa is a 63 y.o. male presenting with chest pain. States he cleaned his gutters  today and while putting away the ladder, noticed mid-sternal  chest pain radiating to the left. Had some shortness of breath associated with exertion. States pain did not improve with rest or worsen with exertion. Denies nausea, diaphoresis. Pain resolved without intervention. Denies fevers,c hills, abdominal pain, leg edema.      Past Medical History  Past Medical History:   Diagnosis Date    Asthma     Hemochromatosis     Hypertension     Sleep apnea     Stroke (CMS/HCC)        Surgical History  Past Surgical History:   Procedure Laterality Date    KNEE ARTHROSCOPY W/ DEBRIDEMENT Right     MR HEAD ANGIO WO IV CONTRAST  08/28/2016    MR HEAD ANGIO WO IV CONTRAST [facility]    MR HEAD ANGIO WO IV CONTRAST  12/26/2020    MR HEAD ANGIO WO IV CONTRAST [facility]        Social History  He reports that he has quit smoking. His smoking use included cigarettes. He has never used smokeless tobacco. He reports current alcohol use. He reports that he does not use drugs.    Family History  Family History   Problem Relation Name Age of Onset    Heart disease Mother      Lung disease Mother      Diabetes Father      Kidney failure Father      No Known Problems Sister      No Known Problems Sister          Allergies  Patient has no known allergies.    Review of Systems   Constitutional: Negative.    HENT: Negative.     Eyes: Negative.    Respiratory:  Positive for shortness of breath.    Cardiovascular:  Positive for chest pain. Negative for leg swelling.   Gastrointestinal: Negative.    Endocrine: Negative.    Genitourinary: Negative.    Musculoskeletal: Negative.    Skin: Negative.    Neurological: Negative.    Psychiatric/Behavioral: Negative.          Physical Exam  Constitutional:       Appearance: Normal appearance.   HENT:      Head: Normocephalic and atraumatic.      Mouth/Throat:  "     Mouth: Mucous membranes are moist.      Pharynx: Oropharynx is clear.   Eyes:      Extraocular Movements: Extraocular movements intact.      Conjunctiva/sclera: Conjunctivae normal.      Pupils: Pupils are equal, round, and reactive to light.   Cardiovascular:      Rate and Rhythm: Regular rhythm.      Pulses: Normal pulses.   Pulmonary:      Effort: Pulmonary effort is normal.      Breath sounds: Normal breath sounds.   Abdominal:      General: Bowel sounds are normal.      Palpations: Abdomen is soft.      Tenderness: There is no abdominal tenderness.   Musculoskeletal:         General: Normal range of motion.      Cervical back: Normal range of motion and neck supple.   Skin:     General: Skin is warm and dry.      Capillary Refill: Capillary refill takes less than 2 seconds.   Neurological:      General: No focal deficit present.      Mental Status: He is alert and oriented to person, place, and time.   Psychiatric:         Mood and Affect: Mood normal.         Behavior: Behavior normal.          Last Recorded Vitals  Blood pressure 124/82, pulse 76, temperature 36.6 °C (97.9 °F), resp. rate 18, height 1.753 m (5' 9\"), weight 120 kg (263 lb 14.3 oz), SpO2 95 %.    Relevant Results  Results for orders placed or performed during the hospital encounter of 12/21/23 (from the past 24 hour(s))   CBC and Auto Differential   Result Value Ref Range    WBC 7.9 4.4 - 11.3 x10*3/uL    nRBC 0.0 0.0 - 0.0 /100 WBCs    RBC 5.07 4.50 - 5.90 x10*6/uL    Hemoglobin 16.5 13.5 - 17.5 g/dL    Hematocrit 46.3 41.0 - 52.0 %    MCV 91 80 - 100 fL    MCH 32.5 26.0 - 34.0 pg    MCHC 35.6 32.0 - 36.0 g/dL    RDW 12.7 11.5 - 14.5 %    Platelets 168 150 - 450 x10*3/uL    Neutrophils % 60.2 40.0 - 80.0 %    Immature Granulocytes %, Automated 0.4 0.0 - 0.9 %    Lymphocytes % 28.8 13.0 - 44.0 %    Monocytes % 8.1 2.0 - 10.0 %    Eosinophils % 1.9 0.0 - 6.0 %    Basophils % 0.6 0.0 - 2.0 %    Neutrophils Absolute 4.76 1.20 - 7.70 x10*3/uL "    Immature Granulocytes Absolute, Automated 0.03 0.00 - 0.70 x10*3/uL    Lymphocytes Absolute 2.28 1.20 - 4.80 x10*3/uL    Monocytes Absolute 0.64 0.10 - 1.00 x10*3/uL    Eosinophils Absolute 0.15 0.00 - 0.70 x10*3/uL    Basophils Absolute 0.05 0.00 - 0.10 x10*3/uL   Comprehensive metabolic panel   Result Value Ref Range    Glucose 128 (H) 65 - 99 mg/dL    Sodium 138 133 - 145 mmol/L    Potassium 4.8 3.4 - 5.1 mmol/L    Chloride 105 97 - 107 mmol/L    Bicarbonate 24 24 - 31 mmol/L    Urea Nitrogen 19 8 - 25 mg/dL    Creatinine 2.00 (H) 0.40 - 1.60 mg/dL    eGFR 37 (L) >60 mL/min/1.73m*2    Calcium 9.4 8.5 - 10.4 mg/dL    Albumin 3.9 3.5 - 5.0 g/dL    Alkaline Phosphatase 58 35 - 125 U/L    Total Protein 6.7 5.9 - 7.9 g/dL    AST 24 5 - 40 U/L    Bilirubin, Total 0.4 0.1 - 1.2 mg/dL    ALT 35 5 - 40 U/L    Anion Gap 9 <=19 mmol/L   Lipase   Result Value Ref Range    Lipase 25 16 - 63 U/L   Serial Troponin, Initial ([facility])   Result Value Ref Range    Troponin T, High Sensitivity 23 (H) <=15 ng/L   Serial Troponin, 2 Hour ([facility])   Result Value Ref Range    Troponin T, High Sensitivity 21 (H) <=15 ng/L     CT abdomen pelvis wo IV contrast    Result Date: 12/21/2023  Interpreted By:  Harrison Monzon, STUDY: CT ABDOMEN PELVIS WO IV CONTRAST; 12/21/2023 8:09 pm   INDICATION: Signs/Symptoms:abdominal pain, renal impairment;   COMPARISON: None   ACCESSION NUMBER(S): [id number]   ORDERING CLINICIAN: HELEN STANTON   TECHNIQUE: Contiguous axial images of the abdomen/pelvis were performed. All CT examinations are performed with 1 or more of the following dose reduction techniques: Automated exposure control, adjustment of mA and/or kv according to patient's size, or use of iterative reconstruction techniques.   FINDINGS: Evaluation of the solid and hollow viscera are somewhat limited without the administration of intravenous or oral contrast.   The liver, gallbladder,  common bile duct, pancreas, spleen, and adrenal glands are  unremarkable.   The kidneys are grossly unremarkable. No urolithiasis is seen.  No hydroureteronephrosis is seen.   The visualized aorta is unremarkable.   The small bowel is not dilated. The appendix is within normal limits.   No free intraperitoneal air or fluid is seen.   The bladder is well distended with no gross wall thickening.   The visualized osseous structures are intact.   Limited images of the lower thorax are unremarkable.       No acute abdominal or pelvic pathology.   MACRO: None.   Signed by: Harrison Monzon 12/21/2023 8:28 PM Dictation workstation:   [id number]    XR chest 2 views    Result Date: 12/21/2023  Interpreted By:  Harrison Monzon, STUDY: XR CHEST 2 VIEWS; 12/21/2023 5:25 pm   INDICATION: Signs/Symptoms:cp   COMPARISON: 04/03/2023   ACCESSION NUMBER(S): [id number]   ORDERING CLINICIAN: HELEN STANTON   TECHNIQUE: Frontal and lateral chest radiographs.   FINDINGS: The cardiomediastinal silhouette is unremarkable. The lungs are clear. No pleural effusion is identified.   The osseous structures are intact.       No acute cardiopulmonary process.       Signed by: Harrison Monzon 12/21/2023 5:28 PM Dictation workstation:   [id number]        Assessment/Plan   Principal Problem:    Acute kidney injury (CMS/HCC)   Consult  nephrology    Baseline creatine ~ 1, now up to 2   Avoid nephrotoxic medications     Chest pain, unspecified type   EKG with no acute ischemic  changes    HS troponin elevated with flat pattern in the setting of SPEEDY    Consult cardiology    Echo 12/6/23 LVEF 50%    NM stress test 2019 with small area of reversible ischemia at anterior segment of LV   Active Problems:    Hemochromatosis   Has not had phlebotomy in 2.5-3 years    May need to establish care with new provider    Hgb stable at this time     Hyperlipidemia   Continue statin     Hypertension   Continue metoprolol, Imdur, ranolazine     Obstructive sleep apnea syndrome   Wife to bring CPAP from home      DVT  prophylaxis    Lovenox             Suma Blanchard, APRN-CNP

## 2023-12-29 ENCOUNTER — OFFICE VISIT (OUTPATIENT)
Dept: PRIMARY CARE | Facility: CLINIC | Age: 63
End: 2023-12-29
Payer: COMMERCIAL

## 2023-12-29 VITALS
RESPIRATION RATE: 20 BRPM | DIASTOLIC BLOOD PRESSURE: 76 MMHG | WEIGHT: 260 LBS | SYSTOLIC BLOOD PRESSURE: 122 MMHG | BODY MASS INDEX: 38.51 KG/M2 | OXYGEN SATURATION: 99 % | HEART RATE: 67 BPM | HEIGHT: 69 IN

## 2023-12-29 DIAGNOSIS — N18.31 STAGE 3A CHRONIC KIDNEY DISEASE (MULTI): ICD-10-CM

## 2023-12-29 DIAGNOSIS — R73.09 ELEVATED GLUCOSE: Primary | ICD-10-CM

## 2023-12-29 LAB
ALBUMIN SERPL-MCNC: 4.1 G/DL (ref 3.5–5)
ALP BLD-CCNC: 55 U/L (ref 35–125)
ALT SERPL-CCNC: 40 U/L (ref 5–40)
ANION GAP SERPL CALC-SCNC: 14 MMOL/L
AST SERPL-CCNC: 23 U/L (ref 5–40)
BILIRUB SERPL-MCNC: 0.4 MG/DL (ref 0.1–1.2)
BUN SERPL-MCNC: 14 MG/DL (ref 8–25)
CALCIUM SERPL-MCNC: 9.6 MG/DL (ref 8.5–10.4)
CHLORIDE SERPL-SCNC: 106 MMOL/L (ref 97–107)
CO2 SERPL-SCNC: 22 MMOL/L (ref 24–31)
CREAT SERPL-MCNC: 1 MG/DL (ref 0.4–1.6)
EST. AVERAGE GLUCOSE BLD GHB EST-MCNC: 114 MG/DL
GFR SERPL CREATININE-BSD FRML MDRD: 84 ML/MIN/1.73M*2
GLUCOSE SERPL-MCNC: 121 MG/DL (ref 65–99)
HBA1C MFR BLD: 5.6 %
POTASSIUM SERPL-SCNC: 4.5 MMOL/L (ref 3.4–5.1)
PROT SERPL-MCNC: 6.4 G/DL (ref 5.9–7.9)
SODIUM SERPL-SCNC: 142 MMOL/L (ref 133–145)

## 2023-12-29 PROCEDURE — 84075 ASSAY ALKALINE PHOSPHATASE: CPT | Performed by: NURSE PRACTITIONER

## 2023-12-29 PROCEDURE — 1036F TOBACCO NON-USER: CPT | Performed by: NURSE PRACTITIONER

## 2023-12-29 PROCEDURE — 83036 HEMOGLOBIN GLYCOSYLATED A1C: CPT | Performed by: NURSE PRACTITIONER

## 2023-12-29 PROCEDURE — 99213 OFFICE O/P EST LOW 20 MIN: CPT | Performed by: NURSE PRACTITIONER

## 2023-12-29 PROCEDURE — 3078F DIAST BP <80 MM HG: CPT | Performed by: NURSE PRACTITIONER

## 2023-12-29 PROCEDURE — 3074F SYST BP LT 130 MM HG: CPT | Performed by: NURSE PRACTITIONER

## 2023-12-29 PROCEDURE — 36415 COLL VENOUS BLD VENIPUNCTURE: CPT | Performed by: NURSE PRACTITIONER

## 2023-12-29 ASSESSMENT — PATIENT HEALTH QUESTIONNAIRE - PHQ9
2. FEELING DOWN, DEPRESSED OR HOPELESS: NOT AT ALL
1. LITTLE INTEREST OR PLEASURE IN DOING THINGS: NOT AT ALL
SUM OF ALL RESPONSES TO PHQ9 QUESTIONS 1 AND 2: 0

## 2023-12-29 ASSESSMENT — ENCOUNTER SYMPTOMS
DEPRESSION: 0
OCCASIONAL FEELINGS OF UNSTEADINESS: 0
LOSS OF SENSATION IN FEET: 0

## 2023-12-29 ASSESSMENT — COLUMBIA-SUICIDE SEVERITY RATING SCALE - C-SSRS
2. HAVE YOU ACTUALLY HAD ANY THOUGHTS OF KILLING YOURSELF?: NO
6. HAVE YOU EVER DONE ANYTHING, STARTED TO DO ANYTHING, OR PREPARED TO DO ANYTHING TO END YOUR LIFE?: NO

## 2023-12-29 NOTE — PROGRESS NOTES
"Subjective   Patient ID: Jace Villa is a 63 y.o. male who presents for Follow-up (Pt went to the er last week for chest pain. Pt is here for a follow up. Pt had high heart rate.pt kidney functions was high.).  No excessive thirst or urination  HPI Went to cardiology for went to ER for chest pain and had decreased kidney fx also mild elevated glucose had stress echo yesterday waiting on results  No swelling in legs or chest pain  Out of work since in August due to seizures/TIA recent dx from neurology [facility] with hydrocephas and will be having further evaluation   Review of Systems    Objective   /76   Pulse 67   Resp 20   Ht 1.753 m (5' 9\")   Wt 118 kg (260 lb)   SpO2 99%   BMI 38.40 kg/m²     Physical Exam  Constitutional:       General: He is not in acute distress.     Appearance: Normal appearance.   Cardiovascular:      Rate and Rhythm: Normal rate and regular rhythm.      Heart sounds: No murmur heard.  Pulmonary:      Breath sounds: Normal breath sounds. No wheezing.   Abdominal:      Palpations: Abdomen is soft.   Skin:     General: Skin is warm.   Neurological:      Mental Status: He is alert.   Psychiatric:         Mood and Affect: Mood normal.         Assessment/Plan   Problem List Items Addressed This Visit    None  Visit Diagnoses         Codes    Elevated glucose    -  Primary R73.09    Stage 3a chronic kidney disease (CMS/HCC)     N18.31               "

## 2024-01-04 LAB
ATRIAL RATE: 90 BPM
P AXIS: 65 DEGREES
P OFFSET: 125 MS
P ONSET: 64 MS
PR INTERVAL: 272 MS
Q ONSET: 200 MS
QRS COUNT: 14 BEATS
QRS DURATION: 132 MS
QT INTERVAL: 380 MS
QTC CALCULATION(BAZETT): 464 MS
QTC FREDERICIA: 435 MS
R AXIS: -55 DEGREES
T AXIS: 89 DEGREES
T OFFSET: 390 MS
VENTRICULAR RATE: 90 BPM

## 2024-01-09 ENCOUNTER — APPOINTMENT (OUTPATIENT)
Dept: NEUROLOGY | Facility: CLINIC | Age: 64
End: 2024-01-09
Payer: COMMERCIAL

## 2024-01-10 ENCOUNTER — APPOINTMENT (OUTPATIENT)
Dept: BEHAVIORAL HEALTH | Facility: CLINIC | Age: 64
End: 2024-01-10
Payer: COMMERCIAL

## 2024-01-17 ENCOUNTER — OFFICE VISIT (OUTPATIENT)
Dept: BEHAVIORAL HEALTH | Facility: CLINIC | Age: 64
End: 2024-01-17
Payer: COMMERCIAL

## 2024-01-17 VITALS
SYSTOLIC BLOOD PRESSURE: 119 MMHG | BODY MASS INDEX: 39.99 KG/M2 | HEART RATE: 81 BPM | TEMPERATURE: 97 F | HEIGHT: 69 IN | DIASTOLIC BLOOD PRESSURE: 70 MMHG | WEIGHT: 270 LBS

## 2024-01-17 DIAGNOSIS — F44.5 FUNCTIONAL NEUROLOGICAL SYMPTOM DISORDER WITH ATTACKS OR SEIZURES: ICD-10-CM

## 2024-01-17 DIAGNOSIS — F41.9 ANXIETY: ICD-10-CM

## 2024-01-17 DIAGNOSIS — F33.1 MODERATE EPISODE OF RECURRENT MAJOR DEPRESSIVE DISORDER (MULTI): ICD-10-CM

## 2024-01-17 PROCEDURE — 3078F DIAST BP <80 MM HG: CPT | Performed by: PSYCHIATRY & NEUROLOGY

## 2024-01-17 PROCEDURE — 99214 OFFICE O/P EST MOD 30 MIN: CPT | Performed by: PSYCHIATRY & NEUROLOGY

## 2024-01-17 PROCEDURE — 1036F TOBACCO NON-USER: CPT | Performed by: PSYCHIATRY & NEUROLOGY

## 2024-01-17 PROCEDURE — 3074F SYST BP LT 130 MM HG: CPT | Performed by: PSYCHIATRY & NEUROLOGY

## 2024-01-17 RX ORDER — DULOXETIN HYDROCHLORIDE 60 MG/1
60 CAPSULE, DELAYED RELEASE ORAL 2 TIMES DAILY
Qty: 180 CAPSULE | Refills: 0 | Status: SHIPPED | OUTPATIENT
Start: 2024-01-17 | End: 2024-04-16

## 2024-01-17 ASSESSMENT — ENCOUNTER SYMPTOMS
SLEEP DISTURBANCE: 0
NAUSEA: 0
VOMITING: 0
HYPERACTIVE: 0
PALPITATIONS: 0
HEADACHES: 1
AGITATION: 0
APPETITE CHANGE: 1
DIARRHEA: 0
DIFFICULTY URINATING: 0
DECREASED CONCENTRATION: 0
DYSPHORIC MOOD: 0
FATIGUE: 1
HALLUCINATIONS: 0
CONFUSION: 0
NERVOUS/ANXIOUS: 1

## 2024-01-17 NOTE — PROGRESS NOTES
Subjective   Patient ID: Jace Villa is a 63 y.o. male who presents for follow up.  Had EEG monitoring, non epileptic seizure, not driving. Frustrating to him.   Had MRI in December:  Ventriculomegaly greater than expected for the parenchymal volume loss   and hyperdynamic flow at the cerebral aqueduct, which may be seen in the   setting of chronic communicating hydrocephalus/NPH.   Has an preston with neurology this month   Continues to have episodes, 2-3 times a week. Not in therapy yet  No thoughts of suicide. No longer showering daily, no crying spells  Wife thinks he is depressed              Review of Systems   Constitutional:  Positive for appetite change and fatigue.   Cardiovascular:  Negative for chest pain and palpitations.   Gastrointestinal:  Negative for diarrhea, nausea and vomiting.   Genitourinary:  Negative for difficulty urinating.   Musculoskeletal:  Negative for gait problem.   Neurological:  Positive for headaches.        Non epileptic seizure   Psychiatric/Behavioral:  Negative for agitation, behavioral problems, confusion, decreased concentration, dysphoric mood, hallucinations, self-injury, sleep disturbance and suicidal ideas. The patient is nervous/anxious. The patient is not hyperactive.        Objective   Physical Exam  Psychiatric:         Attention and Perception: Attention and perception normal.         Mood and Affect: Mood is anxious.         Speech: Speech normal.         Behavior: Behavior normal. Behavior is cooperative.         Thought Content: Thought content normal.         Cognition and Memory: Cognition normal.         Judgment: Judgment normal.       Vitals:    01/17/24 1107   BP: 119/70   Pulse: 81   Temp: 36.1 °C (97 °F)      Office Visit on 12/29/2023   Component Date Value    Hemoglobin A1C 12/29/2023 5.6     Estimated Average Glucose 12/29/2023 114     Glucose 12/29/2023 121 (H)     Sodium 12/29/2023 142     Potassium 12/29/2023 4.5     Chloride 12/29/2023 106      Bicarbonate 12/29/2023 22 (L)     Urea Nitrogen 12/29/2023 14     Creatinine 12/29/2023 1.00     eGFR 12/29/2023 84     Calcium 12/29/2023 9.6     Albumin 12/29/2023 4.1     Alkaline Phosphatase 12/29/2023 55     Total Protein 12/29/2023 6.4     AST 12/29/2023 23     Bilirubin, Total 12/29/2023 0.4     ALT 12/29/2023 40     Anion Gap 12/29/2023 14    Admission on 12/21/2023, Discharged on 12/22/2023   Component Date Value    Ventricular Rate 12/21/2023 90     Atrial Rate 12/21/2023 90     IN Interval 12/21/2023 272     QRS Duration 12/21/2023 132     QT Interval 12/21/2023 380     QTC Calculation(Bazett) 12/21/2023 464     P Axis 12/21/2023 65     R Axis 12/21/2023 -55     T Axis 12/21/2023 89     QRS Count 12/21/2023 14     Q Onset 12/21/2023 200     P Onset 12/21/2023 64     P Offset 12/21/2023 125     T Offset 12/21/2023 390     QTC Fredericia 12/21/2023 435     WBC 12/21/2023 7.9     nRBC 12/21/2023 0.0     RBC 12/21/2023 5.07     Hemoglobin 12/21/2023 16.5     Hematocrit 12/21/2023 46.3     MCV 12/21/2023 91     MCH 12/21/2023 32.5     MCHC 12/21/2023 35.6     RDW 12/21/2023 12.7     Platelets 12/21/2023 168     Neutrophils % 12/21/2023 60.2     Immature Granulocytes %,* 12/21/2023 0.4     Lymphocytes % 12/21/2023 28.8     Monocytes % 12/21/2023 8.1     Eosinophils % 12/21/2023 1.9     Basophils % 12/21/2023 0.6     Neutrophils Absolute 12/21/2023 4.76     Immature Granulocytes Ab* 12/21/2023 0.03     Lymphocytes Absolute 12/21/2023 2.28     Monocytes Absolute 12/21/2023 0.64     Eosinophils Absolute 12/21/2023 0.15     Basophils Absolute 12/21/2023 0.05     Glucose 12/21/2023 128 (H)     Sodium 12/21/2023 138     Potassium 12/21/2023 4.8     Chloride 12/21/2023 105     Bicarbonate 12/21/2023 24     Urea Nitrogen 12/21/2023 19     Creatinine 12/21/2023 2.00 (H)     eGFR 12/21/2023 37 (L)     Calcium 12/21/2023 9.4     Albumin 12/21/2023 3.9     Alkaline Phosphatase 12/21/2023 58     Total Protein 12/21/2023 6.7      AST 12/21/2023 24     Bilirubin, Total 12/21/2023 0.4     ALT 12/21/2023 35     Anion Gap 12/21/2023 9     Lipase 12/21/2023 25     Troponin T, High Sensiti* 12/21/2023 23 (H)     Troponin T, High Sensiti* 12/21/2023 21 (H)     Troponin T, High Sensiti* 12/22/2023 21 (H)     WBC 12/22/2023 7.7     nRBC 12/22/2023 0.0     RBC 12/22/2023 4.90     Hemoglobin 12/22/2023 16.0     Hematocrit 12/22/2023 45.2     MCV 12/22/2023 92     MCH 12/22/2023 32.7     MCHC 12/22/2023 35.4     RDW 12/22/2023 12.8     Platelets 12/22/2023 153     Glucose 12/22/2023 117 (H)     Sodium 12/22/2023 139     Potassium 12/22/2023 4.2     Chloride 12/22/2023 106     Bicarbonate 12/22/2023 23 (L)     Urea Nitrogen 12/22/2023 20     Creatinine 12/22/2023 1.50     eGFR 12/22/2023 52 (L)     Calcium 12/22/2023 9.3     Anion Gap 12/22/2023 10    Lab on 12/15/2023   Component Date Value    BNP 12/15/2023 12    Hospital Outpatient Visit on 12/13/2023   Component Date Value    AV pk william 12/13/2023 0.96     MV E/A ratio 12/13/2023 1.07     LV biplane EF 12/13/2023 48     LA vol index A/L 12/13/2023 24.6     MV avg E/e' ratio 12/13/2023 11.60     RV free wall pk S' 12/13/2023 9.79     LVIDd 12/13/2023 5.01     AV pk grad 12/13/2023 3.7     LV A4C EF 12/13/2023 54.8    [facility] Encounter on 07/26/2023   Component Date Value    Calcium 07/26/2023 9.9     AST 07/26/2023 37     Alkaline Phosphatase 07/26/2023 69     Total Bilirubin 07/26/2023 0.7     Total Protein 07/26/2023 6.9     Albumin 07/26/2023 4.4     Globulin, Total 07/26/2023 2.5     A/G Ratio 07/26/2023 1.8     Sodium 07/26/2023 139     Potassium 07/26/2023 4.5     Chloride 07/26/2023 102     Bicarbonate 07/26/2023 25     Anion Gap 07/26/2023 12     Urea Nitrogen 07/26/2023 17     Creatinine 07/26/2023 1.2     Urea Nitrogen/Creatinine* 07/26/2023 14.2     Glucose 07/26/2023 116 (H)     ALT (SGPT) 07/26/2023 47 (H)     ESTIMATED GFR 07/26/2023 68     Differential Type 07/26/2023 AUTO DIFF      Immature Granulocyte %, * 07/26/2023 0.20     Neutrophil 07/26/2023 52.00     Lymphocytes % 07/26/2023 36.10     Monocytes % 07/26/2023 7.70     Eosinophil 07/26/2023 3.30 (H)     Basophils % 07/26/2023 0.70     Immature Granulocytes Ab* 07/26/2023 0.01     Neutrophils Absolute 07/26/2023 3.03     Lymphocytes Absolute 07/26/2023 2.10     Monocytes Absolute 07/26/2023 0.45     Eosinophils Absolute 07/26/2023 0.19     Basophils Absolute 07/26/2023 0.04     WBC 07/26/2023 5.8     RBC 07/26/2023 5.16     Hemoglobin 07/26/2023 16.5     Hematocrit 07/26/2023 47.2     MCV 07/26/2023 91.5     MCH 07/26/2023 32.0     MCHC 07/26/2023 35.0     RDW-SD 07/26/2023 42.1     RDW-CV 07/26/2023 12.6     Platelets 07/26/2023 209     MPV 07/26/2023 10.9     nRBC 07/26/2023 0     ABSOLUTE NEUTROPHIL CALC* 07/26/2023 3.03     Ferritin 07/26/2023 287     Iron 07/26/2023 270 (H)     TIBC 07/26/2023 347     Iron Saturation 07/26/2023 77.8 (H)     SERUM COLOR 07/26/2023 YELLOW     SERUM CLARITY 07/26/2023 CLEAR     Is the patient fasting? 07/26/2023 FASTING     Cholesterol 07/26/2023 143     Triglycerides 07/26/2023 169 (H)     HDL 07/26/2023 42     LDL Calculated 07/26/2023 67     Cholesterol/HDL Ratio 07/26/2023 3.4     PSA 07/26/2023 2.3     Thyroid Stimulating Horm* 07/26/2023 1.70      Lab Results   Component Value Date     12/29/2023     12/22/2023     12/21/2023    K 4.5 12/29/2023    K 4.2 12/22/2023    K 4.8 12/21/2023    CO2 22 (L) 12/29/2023    CO2 23 (L) 12/22/2023    CO2 24 12/21/2023    BUN 14 12/29/2023    BUN 20 12/22/2023    BUN 19 12/21/2023    CREATININE 1.00 12/29/2023    CREATININE 1.50 12/22/2023    CREATININE 2.00 (H) 12/21/2023    GLUCOSE 121 (H) 12/29/2023    GLUCOSE 117 (H) 12/22/2023    GLUCOSE 128 (H) 12/21/2023    CALCIUM 9.6 12/29/2023    CALCIUM 9.3 12/22/2023    CALCIUM 9.4 12/21/2023     Lab Results   Component Value Date    WBC 7.7 12/22/2023    HGB 16.0 12/22/2023    HCT 45.2 12/22/2023     MCV 92 12/22/2023     12/22/2023     Lab Results   Component Value Date    CHOL 143 07/26/2023    TRIG 169 (H) 07/26/2023    HDL 42 07/26/2023     Assessment/Plan   Problem List Items Addressed This Visit             ICD-10-CM    Anxiety F41.9    Moderate episode of recurrent major depressive disorder (CMS/HCC) F33.1    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

## 2024-03-13 ENCOUNTER — OFFICE VISIT (OUTPATIENT)
Dept: BEHAVIORAL HEALTH | Facility: CLINIC | Age: 64
End: 2024-03-13
Payer: COMMERCIAL

## 2024-03-13 VITALS
WEIGHT: 267 LBS | DIASTOLIC BLOOD PRESSURE: 82 MMHG | HEART RATE: 79 BPM | SYSTOLIC BLOOD PRESSURE: 135 MMHG | BODY MASS INDEX: 39.55 KG/M2 | HEIGHT: 69 IN

## 2024-03-13 DIAGNOSIS — F33.1 MODERATE EPISODE OF RECURRENT MAJOR DEPRESSIVE DISORDER (MULTI): ICD-10-CM

## 2024-03-13 DIAGNOSIS — F41.9 ANXIETY: ICD-10-CM

## 2024-03-13 DIAGNOSIS — F44.5 FUNCTIONAL NEUROLOGICAL SYMPTOM DISORDER WITH ATTACKS OR SEIZURES: ICD-10-CM

## 2024-03-13 PROBLEM — L08.9 LOCAL INFECTION OF SKIN AND SUBCUTANEOUS TISSUE: Status: ACTIVE | Noted: 2023-10-02

## 2024-03-13 PROBLEM — R25.1 TREMOR: Status: ACTIVE | Noted: 2024-03-13

## 2024-03-13 PROBLEM — F43.10 PTSD (POST-TRAUMATIC STRESS DISORDER): Status: ACTIVE | Noted: 2023-11-12

## 2024-03-13 PROBLEM — M25.511 PAIN IN RIGHT SHOULDER: Status: ACTIVE | Noted: 2024-03-13

## 2024-03-13 PROBLEM — F33.2 SEVERE EPISODE OF RECURRENT MAJOR DEPRESSIVE DISORDER, WITHOUT PSYCHOTIC FEATURES (MULTI): Status: ACTIVE | Noted: 2023-10-02

## 2024-03-13 PROBLEM — R25.1 TREMOR, UNSPECIFIED: Status: ACTIVE | Noted: 2024-03-13

## 2024-03-13 PROBLEM — R51.9 HEADACHE: Status: ACTIVE | Noted: 2024-03-13

## 2024-03-13 PROBLEM — R25.1 PHYSIOLOGICAL TREMOR: Status: ACTIVE | Noted: 2023-10-02

## 2024-03-13 PROBLEM — Z86.73 HISTORY OF STROKE WITHOUT RESIDUAL DEFICITS: Status: ACTIVE | Noted: 2024-03-13

## 2024-03-13 PROBLEM — R40.1 CLOUDED CONSCIOUSNESS: Status: ACTIVE | Noted: 2024-03-13

## 2024-03-13 PROBLEM — G47.30 BREATHING-RELATED SLEEP DISORDER: Status: ACTIVE | Noted: 2024-03-13

## 2024-03-13 PROBLEM — G43.909 MIGRAINES: Status: ACTIVE | Noted: 2023-11-12

## 2024-03-13 PROBLEM — Z86.73 H/O: STROKE: Status: ACTIVE | Noted: 2023-11-12

## 2024-03-13 PROBLEM — R56.9 SEIZURE (MULTI): Status: ACTIVE | Noted: 2023-08-15

## 2024-03-13 PROBLEM — S02.5XXA FRACTURE OF TOOTH: Status: ACTIVE | Noted: 2024-03-13

## 2024-03-13 PROBLEM — M54.50 LOW BACK PAIN, UNSPECIFIED: Status: ACTIVE | Noted: 2024-03-13

## 2024-03-13 PROBLEM — I83.93 ASYMPTOMATIC VARICOSE VEINS OF BILATERAL LOWER EXTREMITIES: Status: ACTIVE | Noted: 2024-03-13

## 2024-03-13 PROBLEM — R40.0 DAYTIME SOMNOLENCE: Status: ACTIVE | Noted: 2024-03-13

## 2024-03-13 PROBLEM — E83.119 HEMOCHROMATOSIS, UNSPECIFIED: Status: ACTIVE | Noted: 2023-07-26

## 2024-03-13 PROBLEM — H66.90 OTITIS MEDIA, UNSPECIFIED, UNSPECIFIED EAR: Status: ACTIVE | Noted: 2024-03-13

## 2024-03-13 PROBLEM — Z12.5 ENCOUNTER FOR SCREENING FOR MALIGNANT NEOPLASM OF PROSTATE: Status: ACTIVE | Noted: 2023-07-26

## 2024-03-13 PROBLEM — R06.02 SHORTNESS OF BREATH: Status: ACTIVE | Noted: 2023-04-03

## 2024-03-13 PROBLEM — R06.00 DYSPNEA: Status: ACTIVE | Noted: 2024-03-13

## 2024-03-13 PROBLEM — J32.9 SINUSITIS: Status: ACTIVE | Noted: 2024-03-13

## 2024-03-13 PROBLEM — I20.9 ANGINA PECTORIS (CMS-HCC): Status: ACTIVE | Noted: 2023-12-15

## 2024-03-13 PROBLEM — R47.9 DIFFICULTY SPEAKING: Status: ACTIVE | Noted: 2024-03-13

## 2024-03-13 PROBLEM — F32.A DEPRESSION: Status: ACTIVE | Noted: 2023-11-12

## 2024-03-13 PROBLEM — E66.9 OBESITY WITH BODY MASS INDEX 30 OR GREATER: Status: ACTIVE | Noted: 2023-10-02

## 2024-03-13 PROBLEM — R29.90 NEUROLOGICAL SYMPTOMS: Status: ACTIVE | Noted: 2023-08-15

## 2024-03-13 PROBLEM — G47.10 HYPERSOMNOLENCE: Status: ACTIVE | Noted: 2023-11-12

## 2024-03-13 PROBLEM — R35.0 FREQUENCY OF MICTURITION: Status: ACTIVE | Noted: 2024-03-13

## 2024-03-13 PROBLEM — E83.118 OTHER HEMOCHROMATOSIS: Status: ACTIVE | Noted: 2024-01-30

## 2024-03-13 PROBLEM — Z99.89 DEPENDENCE ON CONTINUOUS POSITIVE AIRWAY PRESSURE VENTILATION: Status: ACTIVE | Noted: 2023-10-02

## 2024-03-13 PROBLEM — R56.9 SEIZURE-LIKE ACTIVITY (MULTI): Status: ACTIVE | Noted: 2023-11-12

## 2024-03-13 PROBLEM — G47.33 OSA (OBSTRUCTIVE SLEEP APNEA): Status: ACTIVE | Noted: 2023-10-02

## 2024-03-13 PROBLEM — F19.21 HISTORY OF SUBSTANCE DEPENDENCE (MULTI): Status: ACTIVE | Noted: 2023-08-15

## 2024-03-13 PROBLEM — R07.9 CHEST PAIN: Status: ACTIVE | Noted: 2023-12-21

## 2024-03-13 PROBLEM — Z79.899 OTHER LONG TERM (CURRENT) DRUG THERAPY: Status: ACTIVE | Noted: 2023-08-15

## 2024-03-13 PROBLEM — I25.10 CORONARY ARTERY DISEASE: Status: ACTIVE | Noted: 2024-03-13

## 2024-03-13 PROBLEM — U07.1 DISEASE DUE TO SEVERE ACUTE RESPIRATORY SYNDROME CORONAVIRUS 2 (SARS-COV-2): Status: ACTIVE | Noted: 2023-04-03

## 2024-03-13 PROBLEM — S09.93XA DENTAL INJURY: Status: ACTIVE | Noted: 2024-03-13

## 2024-03-13 PROBLEM — I10 BENIGN ESSENTIAL HYPERTENSION: Status: ACTIVE | Noted: 2023-07-26
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
## 2024-03-13 PROBLEM — Z79.82 LONG TERM (CURRENT) USE OF ASPIRIN: Status: ACTIVE | Noted: 2023-08-15

## 2024-03-13 PROBLEM — R53.1 WEAKNESS: Status: ACTIVE | Noted: 2024-03-13

## 2024-03-13 PROBLEM — T75.4XXA: Status: ACTIVE | Noted: 2024-03-13

## 2024-03-13 PROBLEM — E78.00 PURE HYPERCHOLESTEROLEMIA: Status: ACTIVE | Noted: 2024-03-13

## 2024-03-13 PROBLEM — G45.9 TRANSIENT ISCHEMIC ATTACK: Status: ACTIVE | Noted: 2023-10-02

## 2024-03-13 PROBLEM — R51.9 HEADACHE, UNSPECIFIED: Status: ACTIVE | Noted: 2023-08-15

## 2024-03-13 PROCEDURE — 3075F SYST BP GE 130 - 139MM HG: CPT | Performed by: PSYCHIATRY & NEUROLOGY

## 2024-03-13 PROCEDURE — 99214 OFFICE O/P EST MOD 30 MIN: CPT | Performed by: PSYCHIATRY & NEUROLOGY

## 2024-03-13 PROCEDURE — 3079F DIAST BP 80-89 MM HG: CPT | Performed by: PSYCHIATRY & NEUROLOGY

## 2024-03-13 PROCEDURE — 1036F TOBACCO NON-USER: CPT | Performed by: PSYCHIATRY & NEUROLOGY

## 2024-03-13 RX ORDER — DULOXETIN HYDROCHLORIDE 60 MG/1
60 CAPSULE, DELAYED RELEASE ORAL 2 TIMES DAILY
Qty: 60 CAPSULE | Refills: 3 | Status: SHIPPED | OUTPATIENT
Start: 2024-03-13 | End: 2024-07-11

## 2024-03-13 RX ORDER — EMPAGLIFLOZIN 10 MG/1
10 TABLET, FILM COATED ORAL DAILY
COMMUNITY
Start: 2024-03-04 | End: 2024-04-03

## 2024-03-13 RX ORDER — CETIRIZINE HYDROCHLORIDE 10 MG/1
10 TABLET ORAL
COMMUNITY

## 2024-03-13 RX ORDER — FLUTICASONE FUROATE AND VILANTEROL 100; 25 UG/1; UG/1
POWDER RESPIRATORY (INHALATION) EVERY 24 HOURS
COMMUNITY
Start: 2021-12-14

## 2024-03-13 RX ORDER — FLUTICASONE FUROATE AND VILANTEROL 100; 25 UG/1; UG/1
1 POWDER RESPIRATORY (INHALATION) EVERY 24 HOURS
COMMUNITY
Start: 2021-12-14

## 2024-03-13 RX ORDER — CLOPIDOGREL BISULFATE 75 MG/1
TABLET ORAL EVERY 24 HOURS
COMMUNITY

## 2024-03-13 RX ORDER — ENOXAPARIN SODIUM 100 MG/ML
40 INJECTION SUBCUTANEOUS
COMMUNITY
Start: 2023-12-21

## 2024-03-13 RX ORDER — METHOCARBAMOL 750 MG/1
750 TABLET, FILM COATED ORAL EVERY 8 HOURS PRN
COMMUNITY
Start: 2024-02-29 | End: 2024-03-30

## 2024-03-13 RX ORDER — NAPROXEN SODIUM 220 MG/1
TABLET ORAL EVERY 12 HOURS
COMMUNITY

## 2024-03-13 RX ORDER — ACETAMINOPHEN 325 MG/1
650 TABLET ORAL EVERY 4 HOURS PRN
COMMUNITY
Start: 2023-12-21

## 2024-03-13 ASSESSMENT — ENCOUNTER SYMPTOMS
NERVOUS/ANXIOUS: 1
VOMITING: 0
NAUSEA: 0
DIARRHEA: 0
HALLUCINATIONS: 0
SLEEP DISTURBANCE: 0
AGITATION: 0
CONFUSION: 0
DECREASED CONCENTRATION: 0
HYPERACTIVE: 0
DYSPHORIC MOOD: 0
HEADACHES: 1

## 2024-03-13 NOTE — PROGRESS NOTES
Subjective   Patient ID: Jace Villa is a 63 y.o. male who presents for follow up  We increased Cymbalta last preston to 60 bid for better control of anxiety, anxiety is not as bad as it has been, continues to have non epileptic episodes,   Has not started therapy yet, his preston in May I believe   Feels the medicine takes the edge   Sleep is ok with C pap.     Applied for disability, paper work completed           Review of Systems   Gastrointestinal:  Negative for diarrhea, nausea and vomiting.   Musculoskeletal:  Negative for gait problem.   Neurological:  Positive for headaches.        Non epileptic seizure    Psychiatric/Behavioral:  Negative for agitation, behavioral problems, confusion, decreased concentration, dysphoric mood, hallucinations, self-injury, sleep disturbance and suicidal ideas. The patient is nervous/anxious. The patient is not hyperactive.      Current Outpatient Medications on File Prior to Visit   Medication Sig Dispense Refill    acetaminophen (Tylenol) 325 mg tablet Take 2 tablets (650 mg) by mouth every 4 hours if needed.      enoxaparin (Lovenox) 40 mg/0.4 mL syringe Inject 0.4 mL (40 mg) under the skin.      fluticasone furoate-vilanteroL (Breo Ellipta) 100-25 mcg/dose inhaler Inhale once every 24 hours.      fluticasone furoate-vilanteroL (Breo Ellipta) 100-25 mcg/dose inhaler Inhale 1 puff once every 24 hours.      Jardiance 10 mg Take 1 tablet (10 mg) by mouth once daily.      methocarbamol (Robaxin) 750 mg tablet Take 1 tablet (750 mg) by mouth every 8 hours if needed.      ubrogepant (Ubrelvy) 100 mg tablet tablet Take half to 1 tab at migraine onset. May repeat same dose once in 2 hours as needed.      albuterol 90 mcg/actuation inhaler Inhale.      aspirin 81 mg capsule once every 24 hours.      atorvastatin (Lipitor) 40 mg tablet TAKE 1 TABLET BY MOUTH EVERY DAY FOR 90 DAYS 90 tablet 3    cetirizine (ZyrTEC) 10 mg tablet Take 1 tablet (10 mg) by mouth once daily.      clopidogrel  (Plavix) 75 mg tablet once every 24 hours.      DOCOSAHEXAENOIC ACID ORAL Take 1,200 g by mouth twice a day.      docosahexaenoic acid/epa (FISH OIL ORAL) Take 1 capsule by mouth once daily.      DULoxetine (Cymbalta) 30 mg DR capsule TAKE 1 CAPSULE (30 MG) BY MOUTH ONCE DAILY. WITH 60 MG TCAP. TOTAL DAILY DOSE 90 MG 90 capsule 0    DULoxetine (Cymbalta) 60 mg DR capsule Take 1 capsule (60 mg) by mouth 2 times a day. 180 capsule 0    duloxetine HCl (DULOXETINE ORAL) Take 60 mg by mouth twice a day.      fenofibrate (Tricor) 54 mg tablet TAKE 1 TABLET BY MOUTH EVERY DAY WITH FOOD FOR 90 DAYS 90 tablet 3    isosorbide mononitrate ER (Imdur) 60 mg 24 hr tablet Take 1 tablet (60 mg) by mouth once daily. Do not crush or chew.      metoprolol succinate XL (Kapspargo Sprinkle) 25 mg 24 hr capsule once every 24 hours.      metoprolol succinate XL (Toprol-XL) 25 mg 24 hr tablet TAKE 1 TABLET BY MOUTH EVERY DAY FOR 90 DAYS 90 tablet 3    modafinil (Provigil) 100 mg tablet Take 0.5 tablets (50 mg) by mouth once daily in the morning. Call sleep nurse for refills and questions  [phone]. Cancel prior script change in dose 45 tablet 0    MULTIVITAMIN ORAL Take by mouth once daily.      omega 3-dha-epa-fish oil (Fish OiL) 1,200 (144-216) mg capsule every 12 hours.      omega 3-dha-epa-fish oil 1,600-500-800 mg/5 mL liquid Fish Oil OIL Quantity: 0 Refills: 0 Ordered: 23-Mar-2023 DO Active      ranolazine (Ranexa) 500 mg 12 hr tablet Take 1 tablet (500 mg) by mouth 2 times a day. Do not crush, chew, or split.      Sunosi 150 mg tablet TAKE ONE TABLET UPON WAKING. CALL SLEEP NURSE FOR REFILLS  30 tablet 2    ZINC ORAL Take by mouth.       No current facility-administered medications on file prior to visit.      Patient Active Problem List   Diagnosis    Anxiety    Aphasia    Asthma    Cardiac device in situ    Cerebral infarction (CMS/HCC)    Necrosis (CMS/HCC)    Cerebrovascular accident (CVA) (CMS/HCC)    Transient  cerebral ischemia    CPAP (continuous positive airway pressure) dependence    Gastroesophageal reflux disease without esophagitis    Hemochromatosis    Hyperlipidemia    Hypersomnia    Secondary hypersomnolence disorder    Hypertension    Obstructive sleep apnea syndrome    Sleep attack    Osteoarthritis    Other specified local infections of the skin and subcutaneous tissue    Panic attack    REM sleep behavior disorder    Status migrainosus    Transient neurological symptoms    Tremor, physiological    Obesity (BMI 30-39.9)    Psychogenic nonepileptic seizure    Severe recurrent major depression without psychotic features (CMS/HCC)    Moderate episode of recurrent major depressive disorder (CMS/HCC)    Allergic rhinitis    Iron metabolism disorder    Intermittent asthma    Obesity, Class II, BMI 35-39.9    Chest pain, unspecified type    Acute kidney injury (CMS/HCC)    Functional neurological symptom disorder with attacks or seizures    Asymptomatic varicose veins of bilateral lower extremities    Difficulty speaking    Depression    Dental injury    Death by electrocution    Daytime somnolence    Coronary artery disease    Clouded consciousness    Benign essential hypertension    Accident caused by electric current    Tremor, unspecified    Physiological tremor    Seizure-like activity (CMS/HCC)    Neurological symptoms    Transient ischemic attack    Migraines    Severe episode of recurrent major depressive disorder, without psychotic features (CMS/HCC)    Hypersomnolence    Dissociative convulsions    Local infection of skin and subcutaneous tissue    BELLA (obstructive sleep apnea)    Breathing-related sleep disorder    Class 2 obesity    Encounter for screening for malignant neoplasm of prostate    Otitis media, unspecified, unspecified ear    Fracture of tooth    Headache    H/O: stroke    History of stroke without residual deficits    History of substance dependence (CMS/HCC)    Low back pain, unspecified     PTSD (post-traumatic stress disorder)    Pure hypercholesterolemia    Seizure (CMS/HCC)    Sinusitis    Weakness    Angina pectoris (CMS/HCC)    Chest pain    Dependence on continuous positive airway pressure ventilation    Other hemochromatosis    Disorder of iron metabolism    Obesity with body mass index 30 or greater    Tremor    Frequency of micturition    Other long term (current) drug therapy    Dyspnea    Disease due to severe acute respiratory syndrome coronavirus 2 (SARS-CoV-2)    Shortness of breath    Pain in right shoulder    Long term (current) use of aspirin    Hemochromatosis, unspecified    Headache, unspecified        Objective   Physical Exam  Psychiatric:         Attention and Perception: Attention and perception normal.         Mood and Affect: Mood is anxious and depressed.         Speech: Speech normal.         Behavior: Behavior normal. Behavior is cooperative.         Thought Content: Thought content normal.         Cognition and Memory: Cognition normal.         Judgment: Judgment normal.       Vitals:    03/13/24 1041   BP: 135/82   Pulse: 79      Office Visit on 12/29/2023   Component Date Value    Hemoglobin A1C 12/29/2023 5.6     Estimated Average Glucose 12/29/2023 114     Glucose 12/29/2023 121 (H)     Sodium 12/29/2023 142     Potassium 12/29/2023 4.5     Chloride 12/29/2023 106     Bicarbonate 12/29/2023 22 (L)     Urea Nitrogen 12/29/2023 14     Creatinine 12/29/2023 1.00     eGFR 12/29/2023 84     Calcium 12/29/2023 9.6     Albumin 12/29/2023 4.1     Alkaline Phosphatase 12/29/2023 55     Total Protein 12/29/2023 6.4     AST 12/29/2023 23     Bilirubin, Total 12/29/2023 0.4     ALT 12/29/2023 40     Anion Gap 12/29/2023 14    Admission on 12/21/2023, Discharged on 12/22/2023   Component Date Value    Ventricular Rate 12/21/2023 90     Atrial Rate 12/21/2023 90     LA Interval 12/21/2023 272     QRS Duration 12/21/2023 132     QT Interval 12/21/2023 380     QTC Calculation(Bazett)  12/21/2023 464     P Axis 12/21/2023 65     R Axis 12/21/2023 -55     T Axis 12/21/2023 89     QRS Count 12/21/2023 14     Q Onset 12/21/2023 200     P Onset 12/21/2023 64     P Offset 12/21/2023 125     T Offset 12/21/2023 390     QTC Fredericia 12/21/2023 435     WBC 12/21/2023 7.9     nRBC 12/21/2023 0.0     RBC 12/21/2023 5.07     Hemoglobin 12/21/2023 16.5     Hematocrit 12/21/2023 46.3     MCV 12/21/2023 91     MCH 12/21/2023 32.5     MCHC 12/21/2023 35.6     RDW 12/21/2023 12.7     Platelets 12/21/2023 168     Neutrophils % 12/21/2023 60.2     Immature Granulocytes %,* 12/21/2023 0.4     Lymphocytes % 12/21/2023 28.8     Monocytes % 12/21/2023 8.1     Eosinophils % 12/21/2023 1.9     Basophils % 12/21/2023 0.6     Neutrophils Absolute 12/21/2023 4.76     Immature Granulocytes Ab* 12/21/2023 0.03     Lymphocytes Absolute 12/21/2023 2.28     Monocytes Absolute 12/21/2023 0.64     Eosinophils Absolute 12/21/2023 0.15     Basophils Absolute 12/21/2023 0.05     Glucose 12/21/2023 128 (H)     Sodium 12/21/2023 138     Potassium 12/21/2023 4.8     Chloride 12/21/2023 105     Bicarbonate 12/21/2023 24     Urea Nitrogen 12/21/2023 19     Creatinine 12/21/2023 2.00 (H)     eGFR 12/21/2023 37 (L)     Calcium 12/21/2023 9.4     Albumin 12/21/2023 3.9     Alkaline Phosphatase 12/21/2023 58     Total Protein 12/21/2023 6.7     AST 12/21/2023 24     Bilirubin, Total 12/21/2023 0.4     ALT 12/21/2023 35     Anion Gap 12/21/2023 9     Lipase 12/21/2023 25     Troponin T, High Sensiti* 12/21/2023 23 (H)     Troponin T, High Sensiti* 12/21/2023 21 (H)     Troponin T, High Sensiti* 12/22/2023 21 (H)     WBC 12/22/2023 7.7     nRBC 12/22/2023 0.0     RBC 12/22/2023 4.90     Hemoglobin 12/22/2023 16.0     Hematocrit 12/22/2023 45.2     MCV 12/22/2023 92     MCH 12/22/2023 32.7     MCHC 12/22/2023 35.4     RDW 12/22/2023 12.8     Platelets 12/22/2023 153     Glucose 12/22/2023 117 (H)     Sodium 12/22/2023 139     Potassium  12/22/2023 4.2     Chloride 12/22/2023 106     Bicarbonate 12/22/2023 23 (L)     Urea Nitrogen 12/22/2023 20     Creatinine 12/22/2023 1.50     eGFR 12/22/2023 52 (L)     Calcium 12/22/2023 9.3     Anion Gap 12/22/2023 10    Lab on 12/15/2023   Component Date Value    BNP 12/15/2023 12    Hospital Outpatient Visit on 12/13/2023   Component Date Value    AV pk william 12/13/2023 0.96     MV E/A ratio 12/13/2023 1.07     LV biplane EF 12/13/2023 48     LA vol index A/L 12/13/2023 24.6     MV avg E/e' ratio 12/13/2023 11.60     RV free wall pk S' 12/13/2023 9.79     LVIDd 12/13/2023 5.01     AV pk grad 12/13/2023 3.7     LV A4C EF 12/13/2023 54.8      Lab Results   Component Value Date     12/29/2023     12/22/2023     12/21/2023    K 4.5 12/29/2023    K 4.2 12/22/2023    K 4.8 12/21/2023    CO2 22 (L) 12/29/2023    CO2 23 (L) 12/22/2023    CO2 24 12/21/2023    BUN 14 12/29/2023    BUN 20 12/22/2023    BUN 19 12/21/2023    CREATININE 1.00 12/29/2023    CREATININE 1.50 12/22/2023    CREATININE 2.00 (H) 12/21/2023    GLUCOSE 121 (H) 12/29/2023    GLUCOSE 117 (H) 12/22/2023    GLUCOSE 128 (H) 12/21/2023    CALCIUM 9.6 12/29/2023    CALCIUM 9.3 12/22/2023    CALCIUM 9.4 12/21/2023     Lab Results   Component Value Date    WBC 7.7 12/22/2023    HGB 16.0 12/22/2023    HCT 45.2 12/22/2023    MCV 92 12/22/2023     12/22/2023     Lab Results   Component Value Date    CHOL 143 07/26/2023    TRIG 169 (H) 07/26/2023    HDL 42 07/26/2023     Assessment/Plan   Problem List Items Addressed This Visit             ICD-10-CM    Anxiety F41.9    Moderate episode of recurrent major depressive disorder (CMS/HCC) F33.1    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

## 2024-05-01 ENCOUNTER — APPOINTMENT (OUTPATIENT)
Dept: SLEEP MEDICINE | Facility: CLINIC | Age: 64
End: 2024-05-01
Payer: COMMERCIAL

## 2024-06-12 ENCOUNTER — APPOINTMENT (OUTPATIENT)
Dept: BEHAVIORAL HEALTH | Facility: CLINIC | Age: 64
End: 2024-06-12
Payer: COMMERCIAL

## 2024-07-15 ENCOUNTER — OFFICE VISIT (OUTPATIENT)
Dept: PRIMARY CARE | Facility: CLINIC | Age: 64
End: 2024-07-15
Payer: COMMERCIAL

## 2024-07-15 VITALS — BODY MASS INDEX: 42.8 KG/M2 | HEIGHT: 69 IN | WEIGHT: 289 LBS

## 2024-07-15 DIAGNOSIS — L23.7 POISON OAK DERMATITIS: Primary | ICD-10-CM

## 2024-07-15 PROCEDURE — 2500000004 HC RX 250 GENERAL PHARMACY W/ HCPCS (ALT 636 FOR OP/ED): Performed by: REGISTERED NURSE

## 2024-07-15 PROCEDURE — 1036F TOBACCO NON-USER: CPT | Performed by: REGISTERED NURSE

## 2024-07-15 PROCEDURE — 96372 THER/PROPH/DIAG INJ SC/IM: CPT | Performed by: REGISTERED NURSE

## 2024-07-15 PROCEDURE — 99213 OFFICE O/P EST LOW 20 MIN: CPT | Performed by: REGISTERED NURSE

## 2024-07-15 RX ORDER — PREDNISONE 20 MG/1
TABLET ORAL
Qty: 20 TABLET | Refills: 0 | Status: SHIPPED | OUTPATIENT
Start: 2024-07-15 | End: 2024-07-27

## 2024-07-15 RX ORDER — TRIAMCINOLONE ACETONIDE 1 MG/G
CREAM TOPICAL 2 TIMES DAILY
Qty: 15 G | Refills: 0 | Status: SHIPPED | OUTPATIENT
Start: 2024-07-15

## 2024-07-15 RX ORDER — GABAPENTIN 300 MG/1
300 CAPSULE ORAL 3 TIMES DAILY
COMMUNITY

## 2024-07-15 RX ORDER — B-COMPLEX WITH VITAMIN C
1 TABLET ORAL DAILY
COMMUNITY

## 2024-07-15 ASSESSMENT — ENCOUNTER SYMPTOMS
LOSS OF SENSATION IN FEET: 0
OCCASIONAL FEELINGS OF UNSTEADINESS: 0
DEPRESSION: 0

## 2024-07-15 ASSESSMENT — COLUMBIA-SUICIDE SEVERITY RATING SCALE - C-SSRS
6. HAVE YOU EVER DONE ANYTHING, STARTED TO DO ANYTHING, OR PREPARED TO DO ANYTHING TO END YOUR LIFE?: NO
1. IN THE PAST MONTH, HAVE YOU WISHED YOU WERE DEAD OR WISHED YOU COULD GO TO SLEEP AND NOT WAKE UP?: NO
2. HAVE YOU ACTUALLY HAD ANY THOUGHTS OF KILLING YOURSELF?: NO
5. HAVE YOU STARTED TO WORK OUT OR WORKED OUT THE DETAILS OF HOW TO KILL YOURSELF? DO YOU INTEND TO CARRY OUT THIS PLAN?: NO
4. HAVE YOU HAD THESE THOUGHTS AND HAD SOME INTENTION OF ACTING ON THEM?: NO

## 2024-07-15 ASSESSMENT — PATIENT HEALTH QUESTIONNAIRE - PHQ9
SUM OF ALL RESPONSES TO PHQ9 QUESTIONS 1 AND 2: 0
1. LITTLE INTEREST OR PLEASURE IN DOING THINGS: NOT AT ALL
2. FEELING DOWN, DEPRESSED OR HOPELESS: NOT AT ALL

## 2024-07-15 ASSESSMENT — PAIN SCALES - GENERAL: PAINLEVEL: 0-NO PAIN

## 2024-07-15 NOTE — PROGRESS NOTES
"Subjective   Patient ID: Jace Villa is a 63 y.o. male who presents for Poison Ivy (POISON OAK- 4 DAYS, NO ITCHING, IRRITATION. ).    Poison Ivy       POISON OAK TO HANDS AND RIGHT EYELID  Review of Systems   Skin:  Positive for rash.   All other systems reviewed and are negative.      Objective   Ht 1.753 m (5' 9\")   Wt 131 kg (289 lb)   BMI 42.68 kg/m²     Physical Exam  Vitals reviewed.   Constitutional:       Appearance: Normal appearance.   Skin:     General: Skin is warm.      Findings: Rash present.   Neurological:      Mental Status: He is alert.   Psychiatric:         Mood and Affect: Mood normal.         Behavior: Behavior normal.         Assessment/Plan   Problem List Items Addressed This Visit    None  Visit Diagnoses         Codes    Poison oak dermatitis    -  Primary L23.7    Relevant Medications    methylPREDNISolone sod succinate (SOLU-Medrol) injection 125 mg (Start on 7/15/2024  8:45 AM)    predniSONE (Deltasone) 20 mg tablet    triamcinolone (Kenalog) 0.1 % cream               "

## 2024-09-06 ENCOUNTER — DOCUMENTATION (OUTPATIENT)
Dept: PRIMARY CARE | Facility: CLINIC | Age: 64
End: 2024-09-06
Payer: COMMERCIAL

## 2024-09-06 ENCOUNTER — PATIENT OUTREACH (OUTPATIENT)
Dept: PRIMARY CARE | Facility: CLINIC | Age: 64
End: 2024-09-06
Payer: COMMERCIAL

## 2024-09-06 NOTE — PROGRESS NOTES
Discharge Facility: [facility]  Discharge Diagnosis: Cerebral ventriculomegaly   Admission Date: 09/03/2024  Discharge Date: 09/05/2024    PCP Appointment Date: 09/10/2024, scheduled by this   Specialist Appointment Date: Hem/Onc 11/26/2024  Hospital Encounter and Summary Linked: Yes    See discharge assessment below for further details    Medications  Medications reviewed with patient/caregiver?: Yes (9/6/2024  9:12 AM)  Is the patient having any side effects they believe may be caused by any medication additions or changes?: No (9/6/2024  9:12 AM)  Does the patient have all medications ordered at discharge?: Not applicable (9/6/2024  9:12 AM)  Prescription Comments: No new scripts given at discharge (9/6/2024  9:12 AM)  Is the patient taking all medications as directed (includes completed medication regime)?: Yes (9/6/2024  9:12 AM)  Medication Comments: Patient denies any issues affording medication (9/6/2024  9:12 AM)    Appointments  Does the patient have a primary care provider?: Yes (9/6/2024  9:12 AM)  Care Management Interventions: Verified appointment date/time/provider (PCP 09/10/204, scheduled by this ) (9/6/2024  9:12 AM)  Has the patient kept scheduled appointments due by today?: Yes (9/6/2024  9:12 AM)    Self Management  What is the home health agency?: N/A (9/6/2024  9:12 AM)  What Durable Medical Equipment (DME) was ordered?: N/A (9/6/2024  9:12 AM)    Patient Teaching  Does the patient have access to their discharge instructions?: Yes (9/6/2024  9:12 AM)  Care Management Interventions: Reviewed instructions with patient (9/6/2024  9:12 AM)  What is the patient's perception of their health status since discharge?: Improving (9/6/2024  9:12 AM)  Is the patient/caregiver able to teach back the hierarchy of who to call/visit for symptoms/problems? PCP, Specialist, Home Health nurse, Urgent Care, ED, 911: Yes (9/6/2024  9:12 AM)  Patient/Caregiver Education Comments: CM spoe to patient via phone. He  states that he is doing very well at home. No new scripts were given at discharge. PCP follow up appointment has been scheduled by this CM. He has no questions at this time and was thankful for this call and assistance. (9/6/2024  9:12 AM)

## 2024-09-07 DIAGNOSIS — E78.49 OTHER HYPERLIPIDEMIA: ICD-10-CM

## 2024-09-09 RX ORDER — METOPROLOL SUCCINATE 25 MG/1
25 TABLET, EXTENDED RELEASE ORAL DAILY
Qty: 90 TABLET | Refills: 3 | Status: SHIPPED | OUTPATIENT
Start: 2024-09-09

## 2024-09-10 ENCOUNTER — PHARMACY VISIT (OUTPATIENT)
Dept: PHARMACY | Facility: CLINIC | Age: 64
End: 2024-09-10
Payer: COMMERCIAL

## 2024-09-10 ENCOUNTER — TELEPHONE (OUTPATIENT)
Dept: PRIMARY CARE | Facility: CLINIC | Age: 64
End: 2024-09-10

## 2024-09-10 ENCOUNTER — OFFICE VISIT (OUTPATIENT)
Dept: PRIMARY CARE | Facility: CLINIC | Age: 64
End: 2024-09-10
Payer: COMMERCIAL

## 2024-09-10 ENCOUNTER — CLINICAL SUPPORT (OUTPATIENT)
Dept: PRIMARY CARE | Facility: CLINIC | Age: 64
End: 2024-09-10
Payer: COMMERCIAL

## 2024-09-10 VITALS
DIASTOLIC BLOOD PRESSURE: 86 MMHG | HEIGHT: 69 IN | HEART RATE: 82 BPM | BODY MASS INDEX: 41.71 KG/M2 | OXYGEN SATURATION: 99 % | SYSTOLIC BLOOD PRESSURE: 120 MMHG | WEIGHT: 281.6 LBS | RESPIRATION RATE: 20 BRPM

## 2024-09-10 DIAGNOSIS — G91.9 HYDROCEPHALUS, UNSPECIFIED TYPE (MULTI): Primary | ICD-10-CM

## 2024-09-10 DIAGNOSIS — E11.69 TYPE 2 DIABETES MELLITUS WITH OTHER SPECIFIED COMPLICATION, WITHOUT LONG-TERM CURRENT USE OF INSULIN (MULTI): ICD-10-CM

## 2024-09-10 DIAGNOSIS — R73.9 HYPERGLYCEMIA: ICD-10-CM

## 2024-09-10 LAB — POC HEMOGLOBIN A1C: 7 % (ref 4.2–6.5)

## 2024-09-10 PROCEDURE — 99495 TRANSJ CARE MGMT MOD F2F 14D: CPT | Performed by: FAMILY MEDICINE

## 2024-09-10 PROCEDURE — RXMED WILLOW AMBULATORY MEDICATION CHARGE

## 2024-09-10 PROCEDURE — 3008F BODY MASS INDEX DOCD: CPT | Performed by: FAMILY MEDICINE

## 2024-09-10 PROCEDURE — 3079F DIAST BP 80-89 MM HG: CPT | Performed by: FAMILY MEDICINE

## 2024-09-10 PROCEDURE — 1036F TOBACCO NON-USER: CPT | Performed by: FAMILY MEDICINE

## 2024-09-10 PROCEDURE — 83036 HEMOGLOBIN GLYCOSYLATED A1C: CPT | Performed by: FAMILY MEDICINE

## 2024-09-10 PROCEDURE — 3074F SYST BP LT 130 MM HG: CPT | Performed by: FAMILY MEDICINE

## 2024-09-10 RX ORDER — SEMAGLUTIDE 0.68 MG/ML
0.5 INJECTION, SOLUTION SUBCUTANEOUS
Qty: 3 ML | Refills: 2 | Status: SHIPPED | OUTPATIENT
Start: 2024-09-10

## 2024-09-10 RX ORDER — SEMAGLUTIDE 0.68 MG/ML
INJECTION, SOLUTION SUBCUTANEOUS
Qty: 3 ML | Refills: 0 | Status: SHIPPED | OUTPATIENT
Start: 2024-09-10 | End: 2024-11-08

## 2024-09-10 ASSESSMENT — PATIENT HEALTH QUESTIONNAIRE - PHQ9
SUM OF ALL RESPONSES TO PHQ9 QUESTIONS 1 AND 2: 0
2. FEELING DOWN, DEPRESSED OR HOPELESS: NOT AT ALL
1. LITTLE INTEREST OR PLEASURE IN DOING THINGS: NOT AT ALL

## 2024-09-10 ASSESSMENT — ENCOUNTER SYMPTOMS
OCCASIONAL FEELINGS OF UNSTEADINESS: 0
LOSS OF SENSATION IN FEET: 0
DEPRESSION: 0

## 2024-09-10 ASSESSMENT — PAIN SCALES - GENERAL: PAINLEVEL: 2

## 2024-09-10 ASSESSMENT — COLUMBIA-SUICIDE SEVERITY RATING SCALE - C-SSRS
1. IN THE PAST MONTH, HAVE YOU WISHED YOU WERE DEAD OR WISHED YOU COULD GO TO SLEEP AND NOT WAKE UP?: NO
6. HAVE YOU EVER DONE ANYTHING, STARTED TO DO ANYTHING, OR PREPARED TO DO ANYTHING TO END YOUR LIFE?: NO
2. HAVE YOU ACTUALLY HAD ANY THOUGHTS OF KILLING YOURSELF?: NO

## 2024-09-10 NOTE — PROGRESS NOTES
"Subjective   Patient ID: Jace Villa is a 63 y.o. male who presents for Follow-up (Pt here for follow up from hospital admission on 9/3/24 for cerebral issues. ).    HPI     Review of Systems    Objective   /86   Pulse 82   Resp 20   Ht 1.753 m (5' 9\")   Wt 128 kg (281 lb 9.6 oz)   SpO2 99%   BMI 41.59 kg/m²     Physical Exam  Constitutional:       General: He is not in acute distress.     Appearance: Normal appearance.   Cardiovascular:      Rate and Rhythm: Normal rate and regular rhythm.      Heart sounds: No murmur heard.  Pulmonary:      Breath sounds: Normal breath sounds. No wheezing.   Neurological:      Mental Status: He is alert.         Assessment/Plan   Problem List Items Addressed This Visit    None  Visit Diagnoses         Codes    Hydrocephalus, unspecified type (Multi)    -  Primary G91.9    Hyperglycemia     R73.9    Relevant Orders    POCT glycosylated hemoglobin (Hb A1C) manually resulted (Completed)    Type 2 diabetes mellitus with other specified complication, without long-term current use of insulin (Multi)     E11.69    Relevant Orders    Follow Up In Clinical Pharmacy               "

## 2024-09-10 NOTE — PROGRESS NOTES
Patient received the following medication education on Ozempic:     - Counseled patient on MOA, expectations, side effects, duration of therapy, administration, and monitoring parameters.  - Provided detailed dosing and administration counseling to ensure proper technique.   - Reviewed Ozempic titration schedule, starting with 0.25 mg once weekly for 4 weeks, then continuing on 0.5 mg once weekly. Pt verbalized understanding.  - Counseled patient on the benefits of GLP-1ra, such as cardiovascular risk reduction, glycemic control, and weight loss potential.  - Reviewed storage requirements of Ozempic when not in use, and when to administer the medication if a dose is missed.  - Advised patient that they may experience improved satiety after meals and portion sizes of meals may be reduced as doses of Ozempic increase.  - Counseled patient to avoid foods that are fatty/oily as this may precipitate the nausea/GI upset that may occur with new start Ozempic.     Medication education provided by JOSE J Garcia, PharmD, BCPS

## 2024-09-23 ENCOUNTER — PATIENT OUTREACH (OUTPATIENT)
Dept: PRIMARY CARE | Facility: CLINIC | Age: 64
End: 2024-09-23
Payer: COMMERCIAL

## 2024-10-01 ENCOUNTER — TELEPHONE (OUTPATIENT)
Dept: PHARMACY | Facility: HOSPITAL | Age: 64
End: 2024-10-01
Payer: COMMERCIAL

## 2024-10-01 NOTE — TELEPHONE ENCOUNTER
Spoke with patient to follow up since starting Ozempic. Reports some burping but no other issues with side effects. Has follow up with PCP this week. Aware to increase to 0.5mg once weekly after 1 month.

## 2024-10-03 ENCOUNTER — OFFICE VISIT (OUTPATIENT)
Dept: PRIMARY CARE | Facility: CLINIC | Age: 64
End: 2024-10-03
Payer: COMMERCIAL

## 2024-10-03 VITALS
DIASTOLIC BLOOD PRESSURE: 83 MMHG | RESPIRATION RATE: 18 BRPM | HEART RATE: 62 BPM | OXYGEN SATURATION: 98 % | BODY MASS INDEX: 41.41 KG/M2 | SYSTOLIC BLOOD PRESSURE: 117 MMHG | WEIGHT: 279.6 LBS | HEIGHT: 69 IN

## 2024-10-03 DIAGNOSIS — E11.22 TYPE 2 DIABETES MELLITUS WITH CHRONIC KIDNEY DISEASE, WITHOUT LONG-TERM CURRENT USE OF INSULIN, UNSPECIFIED CKD STAGE (MULTI): Primary | ICD-10-CM

## 2024-10-03 PROCEDURE — 3074F SYST BP LT 130 MM HG: CPT | Performed by: FAMILY MEDICINE

## 2024-10-03 PROCEDURE — 1036F TOBACCO NON-USER: CPT | Performed by: FAMILY MEDICINE

## 2024-10-03 PROCEDURE — 90471 IMMUNIZATION ADMIN: CPT | Performed by: FAMILY MEDICINE

## 2024-10-03 PROCEDURE — 90673 RIV3 VACCINE NO PRESERV IM: CPT | Performed by: FAMILY MEDICINE

## 2024-10-03 PROCEDURE — 3079F DIAST BP 80-89 MM HG: CPT | Performed by: FAMILY MEDICINE

## 2024-10-03 PROCEDURE — 99213 OFFICE O/P EST LOW 20 MIN: CPT | Performed by: FAMILY MEDICINE

## 2024-10-03 PROCEDURE — 3008F BODY MASS INDEX DOCD: CPT | Performed by: FAMILY MEDICINE

## 2024-10-03 RX ORDER — CARBIDOPA AND LEVODOPA 25; 100 MG/1; MG/1
TABLET ORAL
COMMUNITY
Start: 2024-05-29

## 2024-10-03 ASSESSMENT — ENCOUNTER SYMPTOMS
LOSS OF SENSATION IN FEET: 0
OCCASIONAL FEELINGS OF UNSTEADINESS: 0
DEPRESSION: 0

## 2024-10-03 ASSESSMENT — PATIENT HEALTH QUESTIONNAIRE - PHQ9
1. LITTLE INTEREST OR PLEASURE IN DOING THINGS: NOT AT ALL
SUM OF ALL RESPONSES TO PHQ9 QUESTIONS 1 AND 2: 0
2. FEELING DOWN, DEPRESSED OR HOPELESS: NOT AT ALL

## 2024-10-03 ASSESSMENT — PAIN SCALES - GENERAL: PAINLEVEL: 0-NO PAIN

## 2024-10-03 NOTE — PROGRESS NOTES
"Subjective   Patient ID: Jace Villa is a 63 y.o. male who presents for Diabetes.    Diabetes         Review of Systems    Objective   /83   Pulse 62   Resp 18   Ht 1.753 m (5' 9\")   Wt 127 kg (279 lb 9.6 oz)   SpO2 98%   BMI 41.29 kg/m²     Physical Exam  Constitutional:       General: He is not in acute distress.     Appearance: Normal appearance.   Cardiovascular:      Rate and Rhythm: Normal rate and regular rhythm.      Heart sounds: No murmur heard.  Pulmonary:      Breath sounds: Normal breath sounds. No wheezing.   Neurological:      Mental Status: He is alert.         Assessment/Plan   Problem List Items Addressed This Visit    None  Visit Diagnoses         Codes    Type 2 diabetes mellitus with chronic kidney disease, without long-term current use of insulin, unspecified CKD stage (Multi)    -  Primary E11.22    Relevant Medications    semaglutide 0.25 mg or 0.5 mg (2 mg/3 mL) pen injector               "

## 2024-10-03 NOTE — PROGRESS NOTES
"Subjective   Patient ID: Jace Villa is a 63 y.o. male who presents for Diabetes.    HPI pt here for a recheck on diabetes     Review of Systems    Objective   /83   Pulse 62   Resp 18   Ht 1.753 m (5' 9\")   Wt 127 kg (279 lb 9.6 oz)   SpO2 98%   BMI 41.29 kg/m²     Physical Exam    Assessment/Plan          "

## 2024-10-08 DIAGNOSIS — I63.9 CEREBROVASCULAR ACCIDENT (CVA), UNSPECIFIED MECHANISM (MULTI): ICD-10-CM

## 2024-10-08 RX ORDER — ISOSORBIDE MONONITRATE 60 MG/1
60 TABLET, EXTENDED RELEASE ORAL DAILY
Qty: 30 TABLET | Refills: 11 | Status: SHIPPED | OUTPATIENT
Start: 2024-10-08 | End: 2025-10-08

## 2024-10-16 ENCOUNTER — PATIENT OUTREACH (OUTPATIENT)
Dept: PRIMARY CARE | Facility: CLINIC | Age: 64
End: 2024-10-16
Payer: COMMERCIAL

## 2024-10-23 ENCOUNTER — APPOINTMENT (OUTPATIENT)
Dept: BEHAVIORAL HEALTH | Facility: CLINIC | Age: 64
End: 2024-10-23
Payer: COMMERCIAL

## 2024-10-23 DIAGNOSIS — F41.9 ANXIETY: ICD-10-CM

## 2024-10-23 DIAGNOSIS — F33.1 MODERATE EPISODE OF RECURRENT MAJOR DEPRESSIVE DISORDER: ICD-10-CM

## 2024-10-23 DIAGNOSIS — F44.5 FUNCTIONAL NEUROLOGICAL SYMPTOM DISORDER WITH ATTACKS OR SEIZURES: ICD-10-CM

## 2024-10-23 PROCEDURE — 1123F ACP DISCUSS/DSCN MKR DOCD: CPT | Performed by: PSYCHIATRY & NEUROLOGY

## 2024-10-23 PROCEDURE — 1036F TOBACCO NON-USER: CPT | Performed by: PSYCHIATRY & NEUROLOGY

## 2024-10-23 PROCEDURE — 1160F RVW MEDS BY RX/DR IN RCRD: CPT | Performed by: PSYCHIATRY & NEUROLOGY

## 2024-10-23 PROCEDURE — 99213 OFFICE O/P EST LOW 20 MIN: CPT | Performed by: PSYCHIATRY & NEUROLOGY

## 2024-10-23 PROCEDURE — 1159F MED LIST DOCD IN RCRD: CPT | Performed by: PSYCHIATRY & NEUROLOGY

## 2024-10-23 RX ORDER — DULOXETIN HYDROCHLORIDE 60 MG/1
60 CAPSULE, DELAYED RELEASE ORAL 2 TIMES DAILY
Qty: 180 CAPSULE | Refills: 0 | Status: SHIPPED | OUTPATIENT
Start: 2024-10-23 | End: 2025-01-21

## 2024-10-23 ASSESSMENT — ENCOUNTER SYMPTOMS
AGITATION: 0
NERVOUS/ANXIOUS: 0
SLEEP DISTURBANCE: 0
DECREASED CONCENTRATION: 0
FATIGUE: 1
DYSPHORIC MOOD: 0
DIFFICULTY URINATING: 0
HALLUCINATIONS: 0
CONFUSION: 0
SEIZURES: 1
HYPERACTIVE: 0
TREMORS: 0
APPETITE CHANGE: 0

## 2024-10-23 NOTE — PROGRESS NOTES
"Subjective   Patient ID: Jace Villa is a 64 y.o. male who presents for follow up  HPI  [facility], had lumbar puncture due to NPH. Stated it helped with headache and fatigue.   Reviewing neurology note, seems he is on tapering dose on Sinemet   Stated the headache is back.   Had psychotherapy at [facility]. Stated his wife noticed a difference as his attitude and attention are better   Mood is \" pissed of because I can not drive or work\". Tries to stay busy, no thoughts of suicide. Seizure at least once a week, seems to be the same frequency as before. There was a time when he had no seizure, that is when he had the drain trial   Review of Systems   Constitutional:  Positive for fatigue. Negative for appetite change.   Genitourinary:  Negative for difficulty urinating.   Musculoskeletal:  Negative for gait problem.   Neurological:  Positive for seizures. Negative for tremors.        Non epileptic   Psychiatric/Behavioral:  Negative for agitation, behavioral problems, confusion, decreased concentration, dysphoric mood, hallucinations, self-injury, sleep disturbance and suicidal ideas. The patient is not nervous/anxious and is not hyperactive.      Current Outpatient Medications on File Prior to Visit   Medication Sig Dispense Refill    acetaminophen (Tylenol) 325 mg tablet Take 2 tablets (650 mg) by mouth every 4 hours if needed.      albuterol 90 mcg/actuation inhaler Inhale.      atorvastatin (Lipitor) 40 mg tablet TAKE 1 TABLET BY MOUTH EVERY DAY FOR 90 DAYS 90 tablet 3    B complex-vitamin C tablet Take 1 tablet by mouth once daily.      cetirizine (ZyrTEC) 10 mg tablet Take 1 tablet (10 mg) by mouth once daily.      docosahexaenoic acid/epa (FISH OIL ORAL) Take 1 capsule by mouth once daily.      fenofibrate (Tricor) 54 mg tablet TAKE 1 TABLET BY MOUTH EVERY DAY WITH FOOD FOR 90 DAYS 90 tablet 3    gabapentin (Neurontin) 300 mg capsule Take 1 capsule (300 mg) by mouth 2 times a day.      isosorbide " mononitrate ER (Imdur) 60 mg 24 hr tablet Take 1 tablet (60 mg) by mouth once daily. Do not crush or chew. 30 tablet 11    metoprolol succinate XL (Toprol-XL) 25 mg 24 hr tablet TAKE 1 TABLET BY MOUTH EVERY DAY 90 tablet 3    semaglutide (Ozempic) 0.25 mg or 0.5 mg (2 mg/3 mL) pen injector Inject 0.25 mg under the skin every 7 days for 30 days, THEN 0.5 mg every 7 days. 3 mL 0    semaglutide (Ozempic) 0.25 mg or 0.5 mg (2 mg/3 mL) pen injector Inject 0.5 mg under the skin every 7 days. 3 mL 2    semaglutide 0.25 mg or 0.5 mg (2 mg/3 mL) pen injector Inject 0.5 mg under the skin 1 (one) time per week. 12 mL 3    triamcinolone (Kenalog) 0.1 % cream Apply topically 2 times a day. Apply to affected area 1-2 times daily as needed. 15 g 0    aspirin 81 mg capsule once every 24 hours. (Patient not taking: Reported on 10/23/2024)      carbidopa-levodopa (Sinemet)  mg tablet PLEASE SEE ATTACHED FOR DETAILED DIRECTIONS (Patient not taking: Reported on 10/23/2024)      DULoxetine (Cymbalta) 60 mg DR capsule Take 1 capsule (60 mg) by mouth 2 times a day. 180 capsule 0    ubrogepant (Ubrelvy) 100 mg tablet tablet Take half to 1 tab at migraine onset. May repeat same dose once in 2 hours as needed. (Patient not taking: Reported on 10/23/2024)       No current facility-administered medications on file prior to visit.        Objective   Physical Exam  Psychiatric:         Attention and Perception: Attention normal.         Mood and Affect: Mood normal.         Speech: Speech normal.         Behavior: Behavior normal. Behavior is cooperative.         Thought Content: Thought content normal.         Cognition and Memory: Cognition normal.         Judgment: Judgment normal.       There were no vitals filed for this visit.   Office Visit on 09/10/2024   Component Date Value    POC HEMOGLOBIN A1c 09/10/2024 7.0 (A)      Lab Results   Component Value Date     12/29/2023     12/22/2023     12/21/2023    K 4.5  12/29/2023    K 4.2 12/22/2023    K 4.8 12/21/2023    CO2 22 (L) 12/29/2023    CO2 23 (L) 12/22/2023    CO2 24 12/21/2023    BUN 14 12/29/2023    BUN 20 12/22/2023    BUN 19 12/21/2023    CREATININE 1.00 12/29/2023    CREATININE 1.50 12/22/2023    CREATININE 2.00 (H) 12/21/2023    GLUCOSE 121 (H) 12/29/2023    GLUCOSE 117 (H) 12/22/2023    GLUCOSE 128 (H) 12/21/2023    CALCIUM 9.6 12/29/2023    CALCIUM 9.3 12/22/2023    CALCIUM 9.4 12/21/2023     Lab Results   Component Value Date    WBC 7.7 12/22/2023    HGB 16.0 12/22/2023    HCT 45.2 12/22/2023    MCV 92 12/22/2023     12/22/2023     Lab Results   Component Value Date    CHOL 143 07/26/2023    TRIG 169 (H) 07/26/2023    HDL 42 07/26/2023     Patient Active Problem List   Diagnosis    Anxiety    Aphasia    Asthma    Cardiac device in situ    Cerebral infarction    Necrosis    Cerebrovascular accident (CVA) (Multi)    Transient cerebral ischemia    CPAP (continuous positive airway pressure) dependence    Gastroesophageal reflux disease without esophagitis    Hemochromatosis    Hyperlipidemia    Hypersomnia    Secondary hypersomnolence disorder    Hypertension    Obstructive sleep apnea syndrome    Sleep attack    Osteoarthritis    Other specified local infections of the skin and subcutaneous tissue    Panic attack    REM sleep behavior disorder    Status migrainosus    Transient neurological symptoms    Tremor, physiological    Obesity (BMI 30-39.9)    Psychogenic nonepileptic seizure    Severe recurrent major depression without psychotic features (Multi)    Moderate episode of recurrent major depressive disorder    Allergic rhinitis    Iron metabolism disorder    Intermittent asthma (HHS-HCC)    Obesity, Class II, BMI 35-39.9    Chest pain, unspecified type    Acute kidney injury (CMS-HCC)    Functional neurological symptom disorder with attacks or seizures    Asymptomatic varicose veins of bilateral lower extremities    Difficulty speaking    Depression     Dental injury    Death by electrocution    Daytime somnolence    Coronary artery disease    Clouded consciousness    Benign essential hypertension    Accident caused by electric current    Tremor, unspecified    Physiological tremor    Seizure-like activity (Multi)    Neurological symptoms    Transient ischemic attack    Migraines    Severe episode of recurrent major depressive disorder, without psychotic features (Multi)    Hypersomnolence    Dissociative convulsions    Local infection of skin and subcutaneous tissue    BELLA (obstructive sleep apnea)    Breathing-related sleep disorder    Class 2 obesity    Encounter for screening for malignant neoplasm of prostate    Otitis media, unspecified, unspecified ear    Fracture of tooth    Headache    H/O: stroke    History of stroke without residual deficits    History of substance dependence (Multi)    Low back pain, unspecified    PTSD (post-traumatic stress disorder)    Pure hypercholesterolemia    Seizure (Multi)    Sinusitis    Weakness    Angina pectoris    Chest pain    Dependence on continuous positive airway pressure ventilation    Other hemochromatosis    Disorder of iron metabolism    Obesity with body mass index 30 or greater    Tremor    Frequency of micturition    Other long term (current) drug therapy    Dyspnea    Disease due to severe acute respiratory syndrome coronavirus 2 (SARS-CoV-2)    Shortness of breath    Pain in right shoulder    Long term (current) use of aspirin    Hemochromatosis, unspecified    Headache, unspecified      Assessment/Plan   Problem List Items Addressed This Visit             ICD-10-CM    Anxiety F41.9    Moderate episode of recurrent major depressive disorder F33.1    Functional neurological symptom disorder with attacks or seizures F44.5        Continue Cymbalta as prescribed   Has an [facility] [facility] psychotherapy for [facility] tomorrow, encouraged to go to his [place]   Explained the rule of psychotropics to maintain mood stability   Follow  up in 2-3 months or sooner if needed

## 2024-10-30 DIAGNOSIS — E78.49 OTHER HYPERLIPIDEMIA: ICD-10-CM

## 2024-10-31 RX ORDER — FENOFIBRATE 54 MG/1
54 TABLET ORAL DAILY
Qty: 90 TABLET | Refills: 3 | Status: SHIPPED | OUTPATIENT
Start: 2024-10-31

## 2024-10-31 RX ORDER — ATORVASTATIN CALCIUM 40 MG/1
40 TABLET, FILM COATED ORAL DAILY
Qty: 90 TABLET | Refills: 3 | Status: SHIPPED | OUTPATIENT
Start: 2024-10-31

## 2024-12-02 ENCOUNTER — OFFICE VISIT (OUTPATIENT)
Dept: URGENT CARE | Age: 64
End: 2024-12-02
Payer: COMMERCIAL

## 2024-12-02 VITALS
RESPIRATION RATE: 18 BRPM | BODY MASS INDEX: 39.87 KG/M2 | SYSTOLIC BLOOD PRESSURE: 102 MMHG | OXYGEN SATURATION: 96 % | WEIGHT: 270 LBS | DIASTOLIC BLOOD PRESSURE: 67 MMHG | HEART RATE: 69 BPM | TEMPERATURE: 96.6 F
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
## 2024-12-02 ASSESSMENT — ENCOUNTER SYMPTOMS: ARTHRALGIAS: 1

## 2024-12-02 NOTE — PROGRESS NOTES
Subjective   Patient ID: Jace Villa is a 64 y.o. male. They present today with a chief complaint of Fall (Fell on steps outside yesterday; left forearm pain ).    History of Present Illness    Trauma  Mechanism of injury: Fall    very pleasant 64-year-old male who presents today following a fall yesterday.  He reports he was trying to lyse dogs outside and was attempting to travel a small path for them when he slipped and fell.  He endorses he landed on his left hip and left arm but also hit his head.  He reports his headache is as it usually is but not worse.  Denies any neurosymptoms.  Just endorses his left arm as well when.    Past Medical History  Allergies as of 12/02/2024    (No Known Allergies)       (Not in a hospital admission)       Past Medical History:   Diagnosis Date    Asthma     Hemochromatosis     Hypertension     Sleep apnea     Stroke (Multi)        Past Surgical History:   Procedure Laterality Date    KNEE ARTHROSCOPY W/ DEBRIDEMENT Right     MR HEAD ANGIO WO IV CONTRAST  08/28/2016    MR HEAD ANGIO WO IV CONTRAST [facility]    MR HEAD ANGIO WO IV CONTRAST  12/26/2020    MR HEAD ANGIO WO IV CONTRAST [facility]        reports that he has quit smoking. His smoking use included cigarettes. He has never used smokeless tobacco. He reports current alcohol use. He reports that he does not use drugs.    Review of Systems  Review of Systems   Musculoskeletal:  Positive for arthralgias.                                  Objective    Vitals:    12/02/24 1135   BP: 102/67   BP Location: Left arm   Patient Position: Sitting   BP Cuff Size: Adult   Pulse: 69   Resp: 18   Temp: 35.9 °C (96.6 °F)   TempSrc: Temporal   SpO2: 96%   Weight: 122 kg (270 lb)     No LMP for male patient.    Physical Exam  Constitutional:       Appearance: Normal appearance. He is obese.   Musculoskeletal:         General: Swelling present. Normal range of motion.      Comments: Left forearm          Procedures    Point of Care Test & Imaging Results from this visit  No results found for this visit on 12/02/24.   No results found.    Diagnostic study results (if any) were reviewed by JAQUI Telles.    Assessment/Plan   Allergies, medications, history, and pertinent labs/EKGs/Imaging reviewed by JAQUI Telles.     Medical Decision Making    Fall  -Hematoma to left forearm  -Some edema to left forearm, however has full range of motion to arm and hand.  He is able to wiggle all fingers, both flex and extend and and move arm without any difficulty.  He denies any joint pain, just more endorses pain at site where hematoma  -Tylenol for pain  -Rest, ice, elevate  -If no improvement can do x-ray of that left forearm, given he is having no pain with movement but also has full range of motion low suspicion for fracture, shared decision making with patient and family member at bedside who are in agreement to hold off on x-ray for now (did offer x-ray as reassurance) as they do not feel he sustained any fracture during fall  -Does endorse he hit his head with the fall, denies any blood thinners.  No current neurologic symptoms.  Instructed family should he start experiencing any neurologic symptoms such as change in mental status confusion to take to ED immediately to which they verbalized understanding    Orders and Diagnoses  Diagnoses and all orders for this visit:  Fall, initial encounter      Medical Admin Record      Patient disposition: Home    Electronically signed by JAQUI Telles  12:55 PM

## 2024-12-18 ENCOUNTER — APPOINTMENT (OUTPATIENT)
Dept: BEHAVIORAL HEALTH | Facility: CLINIC | Age: 64
End: 2024-12-18
Payer: COMMERCIAL

## 2024-12-18 VITALS
WEIGHT: 276 LBS | DIASTOLIC BLOOD PRESSURE: 67 MMHG | SYSTOLIC BLOOD PRESSURE: 112 MMHG | HEART RATE: 76 BPM | BODY MASS INDEX: 40.76 KG/M2 | RESPIRATION RATE: 16 BRPM | TEMPERATURE: 97.3 F

## 2024-12-18 DIAGNOSIS — F33.1 MODERATE EPISODE OF RECURRENT MAJOR DEPRESSIVE DISORDER: ICD-10-CM

## 2024-12-18 DIAGNOSIS — F41.9 ANXIETY: ICD-10-CM

## 2024-12-18 PROCEDURE — 3074F SYST BP LT 130 MM HG: CPT | Performed by: PSYCHIATRY & NEUROLOGY

## 2024-12-18 PROCEDURE — 1159F MED LIST DOCD IN RCRD: CPT | Performed by: PSYCHIATRY & NEUROLOGY

## 2024-12-18 PROCEDURE — 3078F DIAST BP <80 MM HG: CPT | Performed by: PSYCHIATRY & NEUROLOGY

## 2024-12-18 PROCEDURE — 1123F ACP DISCUSS/DSCN MKR DOCD: CPT | Performed by: PSYCHIATRY & NEUROLOGY

## 2024-12-18 PROCEDURE — 1160F RVW MEDS BY RX/DR IN RCRD: CPT | Performed by: PSYCHIATRY & NEUROLOGY

## 2024-12-18 PROCEDURE — 1125F AMNT PAIN NOTED PAIN PRSNT: CPT | Performed by: PSYCHIATRY & NEUROLOGY

## 2024-12-18 PROCEDURE — 99213 OFFICE O/P EST LOW 20 MIN: CPT | Performed by: PSYCHIATRY & NEUROLOGY

## 2024-12-18 PROCEDURE — 1036F TOBACCO NON-USER: CPT | Performed by: PSYCHIATRY & NEUROLOGY

## 2024-12-18 RX ORDER — DULOXETIN HYDROCHLORIDE 60 MG/1
60 CAPSULE, DELAYED RELEASE ORAL 2 TIMES DAILY
Qty: 180 CAPSULE | Refills: 0 | Status: SHIPPED | OUTPATIENT
Start: 2024-12-18 | End: 2025-03-18

## 2024-12-18 ASSESSMENT — ENCOUNTER SYMPTOMS
AGITATION: 0
NERVOUS/ANXIOUS: 0
HYPERACTIVE: 0
SLEEP DISTURBANCE: 0
VOMITING: 0
DYSPHORIC MOOD: 0
TREMORS: 1
CONFUSION: 0
DECREASED CONCENTRATION: 0
DIARRHEA: 0
HALLUCINATIONS: 0
DIZZINESS: 0
SEIZURES: 1

## 2024-12-18 ASSESSMENT — COLUMBIA-SUICIDE SEVERITY RATING SCALE - C-SSRS
1. SINCE LAST CONTACT, HAVE YOU WISHED YOU WERE DEAD OR WISHED YOU COULD GO TO SLEEP AND NOT WAKE UP?: NO
2. HAVE YOU ACTUALLY HAD ANY THOUGHTS OF KILLING YOURSELF?: NO
2. HAVE YOU ACTUALLY HAD ANY THOUGHTS OF KILLING YOURSELF?: NO
1. HAVE YOU WISHED YOU WERE DEAD OR WISHED YOU COULD GO TO SLEEP AND NOT WAKE UP?: NO

## 2024-12-18 ASSESSMENT — PAIN SCALES - GENERAL: PAINLEVEL_OUTOF10: 3

## 2024-12-18 NOTE — PROGRESS NOTES
Subjective   Patient ID: Jace Villa is a 64 y.o. male who presents for follow up  HPI  Son drove him to appointment today   Remains in PT and OT. He is also in psychotherapy for FND   Overall, good overall, get jittery and tremor at times, no seizure in Seizure frequency is less. Anxiety is better  No thoughts of suicide   Depression is controlled   Review of Systems   Gastrointestinal:  Negative for diarrhea and vomiting.   Musculoskeletal:  Negative for gait problem.   Neurological:  Positive for tremors and seizures. Negative for dizziness.   Psychiatric/Behavioral:  Negative for agitation, behavioral problems, confusion, decreased concentration, dysphoric mood, hallucinations, self-injury, sleep disturbance and suicidal ideas. The patient is not nervous/anxious and is not hyperactive.        Objective   Physical Exam  Psychiatric:         Attention and Perception: Attention normal.         Mood and Affect: Mood normal.         Speech: Speech normal.         Behavior: Behavior is cooperative.         Thought Content: Thought content normal.         Cognition and Memory: Cognition normal.         Judgment: Judgment normal.       Vitals:    12/18/24 1033   BP: 112/67   Pulse: 76   Resp: 16   Temp: 36.3 °C (97.3 °F)      Office Visit on 09/10/2024   Component Date Value    POC HEMOGLOBIN A1c 09/10/2024 7.0 (A)      Lab Results   Component Value Date     12/29/2023     12/22/2023     12/21/2023    K 4.5 12/29/2023    K 4.2 12/22/2023    K 4.8 12/21/2023    CO2 22 (L) 12/29/2023    CO2 23 (L) 12/22/2023    CO2 24 12/21/2023    BUN 14 12/29/2023    BUN 20 12/22/2023    BUN 19 12/21/2023    CREATININE 1.00 12/29/2023    CREATININE 1.50 12/22/2023    CREATININE 2.00 (H) 12/21/2023    GLUCOSE 121 (H) 12/29/2023    GLUCOSE 117 (H) 12/22/2023    GLUCOSE 128 (H) 12/21/2023    CALCIUM 9.6 12/29/2023    CALCIUM 9.3 12/22/2023    CALCIUM 9.4 12/21/2023     Lab Results   Component Value Date    WBC 7.7  12/22/2023    HGB 16.0 12/22/2023    HCT 45.2 12/22/2023    MCV 92 12/22/2023     12/22/2023     Lab Results   Component Value Date    CHOL 143 07/26/2023    TRIG 169 (H) 07/26/2023    HDL 42 07/26/2023     Current Outpatient Medications on File Prior to Visit   Medication Sig Dispense Refill    acetaminophen (Tylenol) 325 mg tablet Take 2 tablets (650 mg) by mouth every 4 hours if needed.      albuterol 90 mcg/actuation inhaler Inhale.      atorvastatin (Lipitor) 40 mg tablet TAKE 1 TABLET BY MOUTH EVERY DAY 90 tablet 3    B complex-vitamin C tablet Take 1 tablet by mouth once daily.      cetirizine (ZyrTEC) 10 mg tablet Take 1 tablet (10 mg) by mouth once daily.      docosahexaenoic acid/epa (FISH OIL ORAL) Take 1 capsule by mouth once daily.      DULoxetine (Cymbalta) 60 mg DR capsule Take 1 capsule (60 mg) by mouth 2 times a day. 180 capsule 0    fenofibrate (Tricor) 54 mg tablet TAKE 1 TABLET BY MOUTH EVERY DAY WITH FOOD 90 tablet 3    gabapentin (Neurontin) 300 mg capsule Take 1 capsule (300 mg) by mouth 2 times a day.      isosorbide mononitrate ER (Imdur) 60 mg 24 hr tablet Take 1 tablet (60 mg) by mouth once daily. Do not crush or chew. 30 tablet 11    metoprolol succinate XL (Toprol-XL) 25 mg 24 hr tablet TAKE 1 TABLET BY MOUTH EVERY DAY 90 tablet 3    semaglutide (Ozempic) 0.25 mg or 0.5 mg (2 mg/3 mL) pen injector Inject 0.5 mg under the skin every 7 days. 3 mL 2    aspirin 81 mg capsule once every 24 hours. (Patient not taking: Reported on 10/23/2024)      carbidopa-levodopa (Sinemet)  mg tablet PLEASE SEE ATTACHED FOR DETAILED DIRECTIONS (Patient not taking: Reported on 10/23/2024)      semaglutide (Ozempic) 0.25 mg or 0.5 mg (2 mg/3 mL) pen injector Inject 0.25 mg under the skin every 7 days for 30 days, THEN 0.5 mg every 7 days. 3 mL 0    semaglutide 0.25 mg or 0.5 mg (2 mg/3 mL) pen injector Inject 0.5 mg under the skin 1 (one) time per week. 12 mL 3    triamcinolone (Kenalog) 0.1 %  cream Apply topically 2 times a day. Apply to affected area 1-2 times daily as needed. 15 g 0    ubrogepant (Ubrelvy) 100 mg tablet tablet  (Patient not taking: Reported on 12/18/2024)       No current facility-administered medications on file prior to visit.      Patient Active Problem List   Diagnosis    Anxiety    Aphasia    Asthma    Cardiac device in situ    Cerebral infarction    Necrosis    Cerebrovascular accident (CVA) (Multi)    Transient cerebral ischemia    CPAP (continuous positive airway pressure) dependence    Gastroesophageal reflux disease without esophagitis    Hemochromatosis    Hyperlipidemia    Hypersomnia    Secondary hypersomnolence disorder    Hypertension    Obstructive sleep apnea syndrome    Sleep attack    Osteoarthritis    Other specified local infections of the skin and subcutaneous tissue    Panic attack    REM sleep behavior disorder    Status migrainosus    Transient neurological symptoms    Tremor, physiological    Obesity (BMI 30-39.9)    Psychogenic nonepileptic seizure    Severe recurrent major depression without psychotic features (Multi)    Moderate episode of recurrent major depressive disorder    Allergic rhinitis    Iron metabolism disorder    Intermittent asthma (HHS-HCC)    Obesity, Class II, BMI 35-39.9    Chest pain, unspecified type    Acute kidney injury (CMS-HCC)    Functional neurological symptom disorder with attacks or seizures    Asymptomatic varicose veins of bilateral lower extremities    Difficulty speaking    Depression    Dental injury    Death by electrocution    Daytime somnolence    Coronary artery disease    Clouded consciousness    Benign essential hypertension    Accident caused by electric current    Tremor, unspecified    Physiological tremor    Seizure-like activity (Multi)    Neurological symptoms    Transient ischemic attack    Migraines    Severe episode of recurrent major depressive disorder, without psychotic features (Multi)    Hypersomnolence     Dissociative convulsions    Local infection of skin and subcutaneous tissue    BELLA (obstructive sleep apnea)    Breathing-related sleep disorder    Class 2 obesity    Encounter for screening for malignant neoplasm of prostate    Otitis media, unspecified, unspecified ear    Fracture of tooth    Headache    H/O: stroke    History of stroke without residual deficits    History of substance dependence (Multi)    Low back pain, unspecified    PTSD (post-traumatic stress disorder)    Pure hypercholesterolemia    Seizure (Multi)    Sinusitis    Weakness    Angina pectoris    Chest pain    Dependence on continuous positive airway pressure ventilation    Other hemochromatosis    Disorder of iron metabolism    Obesity with body mass index 30 or greater    Tremor    Frequency of micturition    Other long term (current) drug therapy    Dyspnea    Disease due to severe acute respiratory syndrome coronavirus 2 (SARS-CoV-2)    Shortness of breath    Pain in right shoulder    Long term (current) use of aspirin    Hemochromatosis, unspecified    Headache, unspecified      Assessment/Plan   Problem List Items Addressed This Visit             ICD-10-CM    Anxiety F41.9    Moderate episode of recurrent major depressive disorder F33.1        Continue Cymbalta 60   Continue therapy at [facility]   Follow up in 3 months or sooner

## 2025-03-12 ENCOUNTER — APPOINTMENT (OUTPATIENT)
Dept: BEHAVIORAL HEALTH | Facility: CLINIC | Age: 65
End: 2025-03-12
Payer: COMMERCIAL

## 2025-03-26 ENCOUNTER — APPOINTMENT (OUTPATIENT)
Dept: BEHAVIORAL HEALTH | Facility: CLINIC | Age: 65
End: 2025-03-26
Payer: COMMERCIAL

## 2025-03-26 VITALS
HEART RATE: 90 BPM | SYSTOLIC BLOOD PRESSURE: 111 MMHG | BODY MASS INDEX: 40.17 KG/M2 | TEMPERATURE: 96 F | DIASTOLIC BLOOD PRESSURE: 76 MMHG | WEIGHT: 272 LBS | RESPIRATION RATE: 18 BRPM

## 2025-03-26 DIAGNOSIS — F44.5 FUNCTIONAL NEUROLOGICAL SYMPTOM DISORDER WITH ATTACKS OR SEIZURES: ICD-10-CM

## 2025-03-26 DIAGNOSIS — F41.9 ANXIETY: ICD-10-CM

## 2025-03-26 DIAGNOSIS — F33.1 MODERATE EPISODE OF RECURRENT MAJOR DEPRESSIVE DISORDER: ICD-10-CM

## 2025-03-26 PROCEDURE — 1126F AMNT PAIN NOTED NONE PRSNT: CPT | Performed by: PSYCHIATRY & NEUROLOGY

## 2025-03-26 PROCEDURE — 3078F DIAST BP <80 MM HG: CPT | Performed by: PSYCHIATRY & NEUROLOGY

## 2025-03-26 PROCEDURE — 1036F TOBACCO NON-USER: CPT | Performed by: PSYCHIATRY & NEUROLOGY

## 2025-03-26 PROCEDURE — 1160F RVW MEDS BY RX/DR IN RCRD: CPT | Performed by: PSYCHIATRY & NEUROLOGY

## 2025-03-26 PROCEDURE — 1123F ACP DISCUSS/DSCN MKR DOCD: CPT | Performed by: PSYCHIATRY & NEUROLOGY

## 2025-03-26 PROCEDURE — 1159F MED LIST DOCD IN RCRD: CPT | Performed by: PSYCHIATRY & NEUROLOGY

## 2025-03-26 PROCEDURE — 99213 OFFICE O/P EST LOW 20 MIN: CPT | Performed by: PSYCHIATRY & NEUROLOGY

## 2025-03-26 PROCEDURE — 3074F SYST BP LT 130 MM HG: CPT | Performed by: PSYCHIATRY & NEUROLOGY

## 2025-03-26 RX ORDER — DULOXETIN HYDROCHLORIDE 60 MG/1
60 CAPSULE, DELAYED RELEASE ORAL 2 TIMES DAILY
Qty: 180 CAPSULE | Refills: 0 | Status: SHIPPED | OUTPATIENT
Start: 2025-03-26 | End: 2025-06-24

## 2025-03-26 ASSESSMENT — ENCOUNTER SYMPTOMS
DYSPHORIC MOOD: 0
AGITATION: 0
CONFUSION: 0
DECREASED CONCENTRATION: 1
FATIGUE: 1
HYPERACTIVE: 0
NERVOUS/ANXIOUS: 0
APPETITE CHANGE: 0
HALLUCINATIONS: 0

## 2025-03-26 ASSESSMENT — COLUMBIA-SUICIDE SEVERITY RATING SCALE - C-SSRS
2. HAVE YOU ACTUALLY HAD ANY THOUGHTS OF KILLING YOURSELF?: NO
1. SINCE LAST CONTACT, HAVE YOU WISHED YOU WERE DEAD OR WISHED YOU COULD GO TO SLEEP AND NOT WAKE UP?: NO
2. HAVE YOU ACTUALLY HAD ANY THOUGHTS OF KILLING YOURSELF?: NO
1. HAVE YOU WISHED YOU WERE DEAD OR WISHED YOU COULD GO TO SLEEP AND NOT WAKE UP?: NO

## 2025-03-26 ASSESSMENT — PAIN SCALES - GENERAL: PAINLEVEL_OUTOF10: 0-NO PAIN

## 2025-03-26 NOTE — PROGRESS NOTES
"Subjective   Patient ID: Jace Villa is a 64 y.o. male who presents for follow up    HPI    Had a shunt done for NPH, headaches are gone.   Seizures are worsening, daily. He has a diagnosis of FND. All the sudden he starts \" shaking like a leaf, last 45-60 minutes, sometimes 15-30 minutes\".   In therapy for FND at [facility]  Sleep is \" all the time\", he denied depression. No daily sadness, crying spells or thoughts of suicide.   He reported his anxiety is manageable.   Remains on Cymbalta   On disability now   Denied stressors       Current Outpatient Medications on File Prior to Visit   Medication Sig Dispense Refill    acetaminophen (Tylenol) 325 mg tablet Take 2 tablets (650 mg) by mouth every 4 hours if needed.      albuterol 90 mcg/actuation inhaler Inhale.      aspirin 81 mg capsule once every 24 hours. (Patient not taking: Reported on 10/23/2024)      atorvastatin (Lipitor) 40 mg tablet TAKE 1 TABLET BY MOUTH EVERY DAY 90 tablet 3    B complex-vitamin C tablet Take 1 tablet by mouth once daily.      carbidopa-levodopa (Sinemet)  mg tablet PLEASE SEE ATTACHED FOR DETAILED DIRECTIONS (Patient not taking: Reported on 10/23/2024)      cetirizine (ZyrTEC) 10 mg tablet Take 1 tablet (10 mg) by mouth once daily.      docosahexaenoic acid/epa (FISH OIL ORAL) Take 1 capsule by mouth once daily.      DULoxetine (Cymbalta) 60 mg DR capsule Take 1 capsule (60 mg) by mouth 2 times a day. 180 capsule 0    fenofibrate (Tricor) 54 mg tablet TAKE 1 TABLET BY MOUTH EVERY DAY WITH FOOD 90 tablet 3    gabapentin (Neurontin) 300 mg capsule Take 1 capsule (300 mg) by mouth 2 times a day.      isosorbide mononitrate ER (Imdur) 60 mg 24 hr tablet Take 1 tablet (60 mg) by mouth once daily. Do not crush or chew. 30 tablet 11    metoprolol succinate XL (Toprol-XL) 25 mg 24 hr tablet TAKE 1 TABLET BY MOUTH EVERY DAY 90 tablet 3    semaglutide (Ozempic) 0.25 mg or 0.5 mg (2 mg/3 mL) pen injector Inject 0.25 mg under the skin every 7 " days for 30 days, THEN 0.5 mg every 7 days. 3 mL 0    semaglutide (Ozempic) 0.25 mg or 0.5 mg (2 mg/3 mL) pen injector Inject 0.5 mg under the skin every 7 days. 3 mL 2    semaglutide 0.25 mg or 0.5 mg (2 mg/3 mL) pen injector Inject 0.5 mg under the skin 1 (one) time per week. 12 mL 3    triamcinolone (Kenalog) 0.1 % cream Apply topically 2 times a day. Apply to affected area 1-2 times daily as needed. 15 g 0    ubrogepant (Ubrelvy) 100 mg tablet tablet  (Patient not taking: Reported on 12/18/2024)       No current facility-administered medications on file prior to visit.      Patient Active Problem List   Diagnosis    Anxiety    Aphasia    Asthma    Cardiac device in situ    Cerebral infarction    Necrosis    Cerebrovascular accident (CVA) (Multi)    Transient cerebral ischemia    CPAP (continuous positive airway pressure) dependence    Gastroesophageal reflux disease without esophagitis    Hemochromatosis    Hyperlipidemia    Hypersomnia    Secondary hypersomnolence disorder    Hypertension    Obstructive sleep apnea syndrome    Sleep attack    Osteoarthritis    Other specified local infections of the skin and subcutaneous tissue    Panic attack    REM sleep behavior disorder    Status migrainosus    Transient neurological symptoms    Tremor, physiological    Obesity (BMI 30-39.9)    Psychogenic nonepileptic seizure    Severe recurrent major depression without psychotic features (Multi)    Moderate episode of recurrent major depressive disorder    Allergic rhinitis    Iron metabolism disorder    Intermittent asthma ([facility])    Obesity, Class II, BMI 35-39.9    Chest pain, unspecified type    Acute kidney injury ([facility]-H[facility])    Functional neurological symptom disorder with attacks or seizures    Asymptomatic varicose veins of bilateral lower extremities    Difficulty speaking    Depression    Dental injury    Death by electrocution    Daytime somnolence    Coronary artery disease    Clouded consciousness    Benign  essential hypertension    Accident caused by electric current    Tremor, unspecified    Physiological tremor    Seizure-like activity (Multi)    Neurological symptoms    Transient ischemic attack    Migraines    Severe episode of recurrent major depressive disorder, without psychotic features (Multi)    Hypersomnolence    Dissociative convulsions    Local infection of skin and subcutaneous tissue    BELLA (obstructive sleep apnea)    Breathing-related sleep disorder    Class 2 obesity    Encounter for screening for malignant neoplasm of prostate    Otitis media, unspecified, unspecified ear    Fracture of tooth    Headache    H/O: stroke    History of stroke without residual deficits    History of substance dependence (Multi)    Low back pain, unspecified    PTSD (post-traumatic stress disorder)    Pure hypercholesterolemia    Seizure (Multi)    Sinusitis    Weakness    Angina pectoris    Chest pain    Dependence on continuous positive airway pressure ventilation    Other hemochromatosis    Disorder of iron metabolism    Obesity with body mass index 30 or greater    Tremor    Frequency of micturition    Other long term (current) drug therapy    Dyspnea    Disease due to severe acute respiratory syndrome coronavirus 2 (SARS-CoV-2)    Shortness of breath    Pain in right shoulder    Long term (current) use of aspirin    Hemochromatosis, unspecified    Headache, unspecified      Review of Systems   Constitutional:  Positive for fatigue. Negative for appetite change.   Musculoskeletal:  Negative for gait problem.   Neurological:         Non epileptic seizure    Psychiatric/Behavioral:  Positive for decreased concentration. Negative for agitation, behavioral problems, confusion, dysphoric mood, hallucinations and suicidal ideas. The patient is not nervous/anxious and is not hyperactive.        Objective   Physical Exam  Psychiatric:         Attention and Perception: Attention normal.         Mood and Affect: Mood normal.          Speech: Speech normal.         Behavior: Behavior is cooperative.         Thought Content: Thought content normal.         Cognition and Memory: Cognition normal.         Judgment: Judgment normal.       There were no vitals filed for this visit.   No visits with results within 6 Month(s) from this visit.   Latest known visit with results is:   Office Visit on 09/10/2024   Component Date Value    POC HEMOGLOBIN A1c 09/10/2024 7.0 (A)      Lab Results   Component Value Date     12/29/2023     12/22/2023     12/21/2023    K 4.5 12/29/2023    K 4.2 12/22/2023    K 4.8 12/21/2023    CO2 22 (L) 12/29/2023    CO2 23 (L) 12/22/2023    CO2 24 12/21/2023    BUN 14 12/29/2023    BUN 20 12/22/2023    BUN 19 12/21/2023    CREATININE 1.00 12/29/2023    CREATININE 1.50 12/22/2023    CREATININE 2.00 (H) 12/21/2023    GLUCOSE 121 (H) 12/29/2023    GLUCOSE 117 (H) 12/22/2023    GLUCOSE 128 (H) 12/21/2023    CALCIUM 9.6 12/29/2023    CALCIUM 9.3 12/22/2023    CALCIUM 9.4 12/21/2023     Lab Results   Component Value Date    WBC 7.7 12/22/2023    HGB 16.0 12/22/2023    HCT 45.2 12/22/2023    MCV 92 12/22/2023     12/22/2023     Lab Results   Component Value Date    CHOL 143 07/26/2023    TRIG 169 (H) 07/26/2023    HDL 42 07/26/2023     Assessment/Plan   Problem List Items Addressed This Visit             ICD-10-CM    Anxiety F41.9    Moderate episode of recurrent major depressive disorder F33.1    Functional neurological symptom disorder with attacks or seizures F44.5   Continue Cymbalta 120 mg po daily   Continue therapy at [facility]  Follow up in 3 months or sooner if needed

## 2025-04-29 ENCOUNTER — OFFICE VISIT (OUTPATIENT)
Dept: PRIMARY CARE | Facility: CLINIC | Age: 65
End: 2025-04-29
Payer: COMMERCIAL

## 2025-04-29 VITALS
HEART RATE: 75 BPM | WEIGHT: 272 LBS | SYSTOLIC BLOOD PRESSURE: 104 MMHG | RESPIRATION RATE: 18 BRPM | OXYGEN SATURATION: 95 % | DIASTOLIC BLOOD PRESSURE: 68 MMHG | BODY MASS INDEX: 40.29 KG/M2 | TEMPERATURE: 97.2 F | HEIGHT: 69 IN

## 2025-04-29 DIAGNOSIS — H66.90 ACUTE OTITIS MEDIA, UNSPECIFIED OTITIS MEDIA TYPE: Primary | ICD-10-CM

## 2025-04-29 PROCEDURE — 3008F BODY MASS INDEX DOCD: CPT | Performed by: REGISTERED NURSE

## 2025-04-29 PROCEDURE — 1160F RVW MEDS BY RX/DR IN RCRD: CPT | Performed by: REGISTERED NURSE

## 2025-04-29 PROCEDURE — 3078F DIAST BP <80 MM HG: CPT | Performed by: REGISTERED NURSE

## 2025-04-29 PROCEDURE — 1125F AMNT PAIN NOTED PAIN PRSNT: CPT | Performed by: REGISTERED NURSE

## 2025-04-29 PROCEDURE — 1159F MED LIST DOCD IN RCRD: CPT | Performed by: REGISTERED NURSE

## 2025-04-29 PROCEDURE — 1036F TOBACCO NON-USER: CPT | Performed by: REGISTERED NURSE

## 2025-04-29 PROCEDURE — 99213 OFFICE O/P EST LOW 20 MIN: CPT | Performed by: REGISTERED NURSE

## 2025-04-29 PROCEDURE — 3074F SYST BP LT 130 MM HG: CPT | Performed by: REGISTERED NURSE

## 2025-04-29 PROCEDURE — 1123F ACP DISCUSS/DSCN MKR DOCD: CPT | Performed by: REGISTERED NURSE

## 2025-04-29 RX ORDER — AMOXICILLIN AND CLAVULANATE POTASSIUM 875; 125 MG/1; MG/1
875 TABLET, FILM COATED ORAL 2 TIMES DAILY
Qty: 20 TABLET | Refills: 0 | Status: SHIPPED | OUTPATIENT
Start: 2025-04-29 | End: 2025-05-09

## 2025-04-29 ASSESSMENT — PATIENT HEALTH QUESTIONNAIRE - PHQ9
2. FEELING DOWN, DEPRESSED OR HOPELESS: NOT AT ALL
SUM OF ALL RESPONSES TO PHQ9 QUESTIONS 1 AND 2: 0
1. LITTLE INTEREST OR PLEASURE IN DOING THINGS: NOT AT ALL

## 2025-04-29 ASSESSMENT — PAIN SCALES - GENERAL: PAINLEVEL_OUTOF10: 9

## 2025-04-29 NOTE — PROGRESS NOTES
"Subjective   Patient ID: Jace Villa is a 64 y.o. male who presents for Earache (Pt c/o left ear pain x couple weeks).    HPI   Pt here with reports of ongoing left ear pain for the past 2 weeks  Review of Systems   HENT:  Positive for ear pain.    All other systems reviewed and are negative.      Objective   /68   Pulse 75   Temp 36.2 °C (97.2 °F)   Resp 18   Ht 1.753 m (5' 9\")   Wt 123 kg (272 lb)   SpO2 95%   BMI 40.17 kg/m²     Physical Exam  Vitals reviewed.   Constitutional:       Appearance: Normal appearance.   HENT:      Right Ear: Tympanic membrane, ear canal and external ear normal.      Left Ear: Ear canal and external ear normal.   Cardiovascular:      Rate and Rhythm: Normal rate.      Pulses: Normal pulses.   Pulmonary:      Effort: Pulmonary effort is normal.   Neurological:      Mental Status: He is alert.   Psychiatric:         Mood and Affect: Mood normal.         Behavior: Behavior normal.         Assessment/Plan   Problem List Items Addressed This Visit           ICD-10-CM    Otitis media, unspecified, unspecified ear - Primary H66.90    Relevant Medications    amoxicillin-clavulanate (Augmentin) 875-125 mg tablet          "

## 2025-05-07 ENCOUNTER — TELEPHONE (OUTPATIENT)
Dept: PRIMARY CARE | Facility: CLINIC | Age: 65
End: 2025-05-07
Payer: COMMERCIAL

## 2025-06-25 ENCOUNTER — APPOINTMENT (OUTPATIENT)
Dept: BEHAVIORAL HEALTH | Facility: CLINIC | Age: 65
End: 2025-06-25
Payer: COMMERCIAL

## 2025-06-25 VITALS — WEIGHT: 274 LBS | BODY MASS INDEX: 40.46 KG/M2 | TEMPERATURE: 97.2 F | RESPIRATION RATE: 16 BRPM | HEART RATE: 71 BPM

## 2025-06-25 DIAGNOSIS — F41.9 ANXIETY: ICD-10-CM

## 2025-06-25 DIAGNOSIS — F33.1 MODERATE EPISODE OF RECURRENT MAJOR DEPRESSIVE DISORDER: ICD-10-CM

## 2025-06-25 PROCEDURE — 1160F RVW MEDS BY RX/DR IN RCRD: CPT | Performed by: PSYCHIATRY & NEUROLOGY

## 2025-06-25 PROCEDURE — 99213 OFFICE O/P EST LOW 20 MIN: CPT | Performed by: PSYCHIATRY & NEUROLOGY

## 2025-06-25 PROCEDURE — 1126F AMNT PAIN NOTED NONE PRSNT: CPT | Performed by: PSYCHIATRY & NEUROLOGY

## 2025-06-25 PROCEDURE — 1036F TOBACCO NON-USER: CPT | Performed by: PSYCHIATRY & NEUROLOGY

## 2025-06-25 PROCEDURE — 1159F MED LIST DOCD IN RCRD: CPT | Performed by: PSYCHIATRY & NEUROLOGY

## 2025-06-25 RX ORDER — BUPROPION HYDROCHLORIDE 150 MG/1
150 TABLET ORAL
COMMUNITY
Start: 2025-02-26 | End: 2025-06-25 | Stop reason: WASHOUT

## 2025-06-25 RX ORDER — AMOXICILLIN 250 MG
2 CAPSULE ORAL EVERY 12 HOURS PRN
COMMUNITY
Start: 2025-02-05

## 2025-06-25 RX ORDER — HYDROCORTISONE 25 MG/G
1 CREAM TOPICAL 2 TIMES DAILY
COMMUNITY
Start: 2025-05-15

## 2025-06-25 RX ORDER — POLYETHYLENE GLYCOL 3350 17 G/17G
17 POWDER, FOR SOLUTION ORAL DAILY
COMMUNITY
Start: 2025-05-09

## 2025-06-25 RX ORDER — DULOXETIN HYDROCHLORIDE 60 MG/1
60 CAPSULE, DELAYED RELEASE ORAL 2 TIMES DAILY
Qty: 180 CAPSULE | Refills: 0 | Status: SHIPPED | OUTPATIENT
Start: 2025-06-25 | End: 2025-09-23

## 2025-06-25 ASSESSMENT — COLUMBIA-SUICIDE SEVERITY RATING SCALE - C-SSRS
2. HAVE YOU ACTUALLY HAD ANY THOUGHTS OF KILLING YOURSELF?: NO
1. HAVE YOU WISHED YOU WERE DEAD OR WISHED YOU COULD GO TO SLEEP AND NOT WAKE UP?: NO
2. HAVE YOU ACTUALLY HAD ANY THOUGHTS OF KILLING YOURSELF?: NO
1. SINCE LAST CONTACT, HAVE YOU WISHED YOU WERE DEAD OR WISHED YOU COULD GO TO SLEEP AND NOT WAKE UP?: NO

## 2025-06-25 ASSESSMENT — ENCOUNTER SYMPTOMS
DYSPHORIC MOOD: 0
HALLUCINATIONS: 0
SEIZURES: 0
SLEEP DISTURBANCE: 0
NERVOUS/ANXIOUS: 0

## 2025-06-25 ASSESSMENT — PAIN SCALES - GENERAL: PAINLEVEL_OUTOF10: 0-NO PAIN

## 2025-06-25 NOTE — PROGRESS NOTES
Subjective   Patient ID: Jace Villa is a 64 y.o. male who presents for follow up   HPI  Depression is stable, got back from a vacation, traveling and enjoying his time  No thoughts of suicide. Sleep is ok.   Anxiety is manageable    Last preston, we continued Cymbalta 60 bid   Med list included Wellbutrin, unclear who prescribed it. He messaged his wife who helps managing his meds, she answered back that he does not take it. It was not on his med list that he brought with him   No seizure / non epileptic seizure   He has been active   Medications Ordered Prior to Encounter[1]   Problem List[2]   Review of Systems   Musculoskeletal:  Negative for gait problem.   Neurological:  Negative for seizures.   Psychiatric/Behavioral:  Negative for dysphoric mood, hallucinations, sleep disturbance and suicidal ideas. The patient is not nervous/anxious.        Objective   Physical Exam  Psychiatric:         Attention and Perception: Attention normal.         Mood and Affect: Mood normal.         Speech: Speech normal.         Behavior: Behavior is cooperative.         Thought Content: Thought content normal.         Cognition and Memory: Cognition normal.         Judgment: Judgment normal.       Vitals:    06/25/25 0906   Pulse: 71   Resp: 16   Temp: 36.2 °C (97.2 °F)      No visits with results within 6 Month(s) from this visit.   Latest known visit with results is:   Office Visit on 09/10/2024   Component Date Value    POC HEMOGLOBIN A1c 09/10/2024 7.0 (A)      Lab Results   Component Value Date     12/29/2023     12/22/2023     12/21/2023    K 4.5 12/29/2023    K 4.2 12/22/2023    K 4.8 12/21/2023    CO2 22 (L) 12/29/2023    CO2 23 (L) 12/22/2023    CO2 24 12/21/2023    BUN 14 12/29/2023    BUN 20 12/22/2023    BUN 19 12/21/2023    CREATININE 1.00 12/29/2023    CREATININE 1.50 12/22/2023    CREATININE 2.00 (H) 12/21/2023    GLUCOSE 121 (H) 12/29/2023    GLUCOSE 117 (H) 12/22/2023    GLUCOSE 128 (H)  12/21/2023    CALCIUM 9.6 12/29/2023    CALCIUM 9.3 12/22/2023    CALCIUM 9.4 12/21/2023     Lab Results   Component Value Date    WBC 7.7 12/22/2023    HGB 16.0 12/22/2023    HCT 45.2 12/22/2023    MCV 92 12/22/2023     12/22/2023     Lab Results   Component Value Date    CHOL 143 07/26/2023    TRIG 169 (H) 07/26/2023    HDL 42 07/26/2023     Assessment/Plan   Problem List Items Addressed This Visit           ICD-10-CM    Anxiety F41.9    Moderate episode of recurrent major depressive disorder F33.1   Continue Duloxetine 60 bid   Follow up in 3-4 months or sooner if needed            [1]   Current Outpatient Medications on File Prior to Visit   Medication Sig Dispense Refill    acetaminophen (Tylenol) 325 mg tablet Take 2 tablets (650 mg) by mouth every 4 hours if needed.      albuterol 90 mcg/actuation inhaler Inhale.      atorvastatin (Lipitor) 40 mg tablet TAKE 1 TABLET BY MOUTH EVERY DAY 90 tablet 3    B complex-vitamin C tablet Take 1 tablet by mouth once daily.      buPROPion XL (Wellbutrin XL) 150 mg 24 hr tablet Take 1 tablet (150 mg) by mouth once daily.      cetirizine (ZyrTEC) 10 mg tablet Take 1 tablet (10 mg) by mouth once daily.      docosahexaenoic acid/epa (FISH OIL ORAL) Take 1 capsule by mouth once daily.      fenofibrate (Tricor) 54 mg tablet TAKE 1 TABLET BY MOUTH EVERY DAY WITH FOOD 90 tablet 3    hydrocortisone (Anusol-HC) 2.5 % rectal cream Insert 1 Application into the rectum 2 times a day.      isosorbide mononitrate ER (Imdur) 60 mg 24 hr tablet Take 1 tablet (60 mg) by mouth once daily. Do not crush or chew. 30 tablet 11    metoprolol succinate XL (Toprol-XL) 25 mg 24 hr tablet TAKE 1 TABLET BY MOUTH EVERY DAY 90 tablet 3    polyethylene glycol (Glycolax, Miralax) 17 gram/dose powder Mix 17 g of powder and drink once daily.      semaglutide 0.25 mg or 0.5 mg (2 mg/3 mL) pen injector Inject 0.5 mg under the skin 1 (one) time per week. 12 mL 3    sennosides-docusate sodium  (An-Colace) 8.6-50 mg tablet 2 tablets by Enteral route every 12 hours if needed.      DULoxetine (Cymbalta) 60 mg DR capsule Take 1 capsule (60 mg) by mouth 2 times a day. 180 capsule 0     No current facility-administered medications on file prior to visit.   [2]   Patient Active Problem List  Diagnosis    Anxiety    Aphasia    Asthma    Cardiac device in situ    Cerebral infarction    Necrosis    Cerebrovascular accident (CVA) (Multi)    Transient cerebral ischemia    CPAP (continuous positive airway pressure) dependence    Gastroesophageal reflux disease without esophagitis    Hemochromatosis    Hyperlipidemia    Hypersomnia    Secondary hypersomnolence disorder    Hypertension    Obstructive sleep apnea syndrome    Sleep attack    Osteoarthritis    Other specified local infections of the skin and subcutaneous tissue    Panic attack    REM sleep behavior disorder    Status migrainosus    Transient neurological symptoms    Tremor, physiological    Obesity (BMI 30-39.9)    Psychogenic nonepileptic seizure    Severe recurrent major depression without psychotic features (Multi)    Moderate episode of recurrent major depressive disorder    Allergic rhinitis    Iron metabolism disorder    Intermittent asthma ([facility])    Obesity, Class II, BMI 35-39.9    Chest pain, unspecified type    Acute kidney injury    Functional neurological symptom disorder with attacks or seizures    Asymptomatic varicose veins of bilateral lower extremities    Difficulty speaking    Depression    Dental injury    Death by electrocution    Daytime somnolence    Coronary artery disease    Clouded consciousness    Benign essential hypertension    Accident caused by electric current    Tremor, unspecified    Physiological tremor    Seizure-like activity (Multi)    Neurological symptoms    Transient ischemic attack    Migraines    Severe episode of recurrent major depressive disorder, without psychotic features (Multi)    Hypersomnolence     Dissociative convulsions    Local infection of skin and subcutaneous tissue    BELLA (obstructive sleep apnea)    Breathing-related sleep disorder    Class 2 obesity    Encounter for screening for malignant neoplasm of prostate    Otitis media, unspecified, unspecified ear    Fracture of tooth    Headache    H/O: stroke    History of stroke without residual deficits    History of substance dependence (Multi)    Low back pain, unspecified    PTSD (post-traumatic stress disorder)    Pure hypercholesterolemia    Seizure (Multi)    Sinusitis    Weakness    Angina pectoris    Chest pain    Dependence on continuous positive airway pressure ventilation    Other hemochromatosis    Disorder of iron metabolism    Obesity with body mass index 30 or greater    Tremor    Frequency of micturition    Other long term (current) drug therapy    Dyspnea    Disease due to severe acute respiratory syndrome coronavirus 2 (SARS-CoV-2)    Shortness of breath    Pain in right shoulder    Long term (current) use of aspirin    Hemochromatosis, unspecified    Headache, unspecified

## 2025-07-02 ENCOUNTER — TELEPHONE (OUTPATIENT)
Dept: OTHER | Age: 65
End: 2025-07-02
Payer: MEDICARE

## 2025-07-02 ENCOUNTER — TELEPHONE (OUTPATIENT)
Dept: BEHAVIORAL HEALTH | Facility: CLINIC | Age: 65
End: 2025-07-02
Payer: MEDICARE

## 2025-07-07 ENCOUNTER — TELEPHONE (OUTPATIENT)
Dept: OTHER | Age: 65
End: 2025-07-07

## 2025-08-14 DIAGNOSIS — E78.49 OTHER HYPERLIPIDEMIA: ICD-10-CM

## 2025-08-14 RX ORDER — ATORVASTATIN CALCIUM 40 MG/1
40 TABLET, FILM COATED ORAL DAILY
Qty: 90 TABLET | Refills: 3 | Status: SHIPPED | OUTPATIENT
Start: 2025-08-14

## 2025-08-14 RX ORDER — METOPROLOL SUCCINATE 25 MG/1
25 TABLET, EXTENDED RELEASE ORAL DAILY
Qty: 90 TABLET | Refills: 3 | Status: SHIPPED | OUTPATIENT
Start: 2025-08-14

## 2025-09-02 DIAGNOSIS — E78.49 OTHER HYPERLIPIDEMIA: ICD-10-CM

## 2025-09-02 RX ORDER — FENOFIBRATE 54 MG/1
54 TABLET ORAL DAILY
Qty: 90 TABLET | Refills: 3 | Status: SHIPPED | OUTPATIENT
Start: 2025-09-02

## 2025-09-24 ENCOUNTER — APPOINTMENT (OUTPATIENT)
Dept: BEHAVIORAL HEALTH | Facility: CLINIC | Age: 65
End: 2025-09-24
Payer: MEDICARE